# Patient Record
Sex: MALE | Race: WHITE | Employment: OTHER | URBAN - METROPOLITAN AREA
[De-identification: names, ages, dates, MRNs, and addresses within clinical notes are randomized per-mention and may not be internally consistent; named-entity substitution may affect disease eponyms.]

---

## 2021-12-22 ENCOUNTER — HOSPITAL ENCOUNTER (OUTPATIENT)
Dept: CT IMAGING | Age: 51
Discharge: HOME | End: 2021-12-22
Attending: INTERNAL MEDICINE

## 2021-12-22 DIAGNOSIS — Z13.6 SCREENING FOR CARDIOVASCULAR CONDITION: ICD-10-CM

## 2022-01-02 ENCOUNTER — HOSPITAL ENCOUNTER (OUTPATIENT)
Age: 52
Discharge: ANOTHER HOSPITAL | End: 2022-01-02
Attending: EMERGENCY MEDICINE
Payer: COMMERCIAL

## 2022-01-02 ENCOUNTER — HOSPITAL ENCOUNTER (EMERGENCY)
Facility: HOSPITAL | Age: 52
Discharge: LEFT BEFORE DISCHARGE | End: 2022-01-02
Payer: COMMERCIAL

## 2022-01-02 VITALS
HEART RATE: 86 BPM | BODY MASS INDEX: 30.01 KG/M2 | HEIGHT: 68 IN | SYSTOLIC BLOOD PRESSURE: 138 MMHG | OXYGEN SATURATION: 95 % | WEIGHT: 198 LBS | TEMPERATURE: 98 F | RESPIRATION RATE: 18 BRPM | DIASTOLIC BLOOD PRESSURE: 84 MMHG

## 2022-01-02 DIAGNOSIS — Z20.822 SUSPECTED COVID-19 VIRUS INFECTION: Primary | ICD-10-CM

## 2022-01-02 DIAGNOSIS — R07.9 CHEST PAIN OF UNCERTAIN ETIOLOGY: ICD-10-CM

## 2022-01-02 LAB — SARS-COV-2 RNA RESP QL NAA+PROBE: NOT DETECTED

## 2022-01-02 PROCEDURE — 99204 OFFICE O/P NEW MOD 45 MIN: CPT

## 2022-01-02 PROCEDURE — 99214 OFFICE O/P EST MOD 30 MIN: CPT

## 2022-01-02 PROCEDURE — 93005 ELECTROCARDIOGRAM TRACING: CPT

## 2022-01-02 PROCEDURE — 93010 ELECTROCARDIOGRAM REPORT: CPT

## 2022-01-02 RX ORDER — ACETAMINOPHEN 325 MG/1
325 TABLET ORAL EVERY 6 HOURS PRN
COMMUNITY

## 2022-01-02 NOTE — ED PROVIDER NOTES
Patient Seen in: [facility]      History   Patient presents with:  Cough/URI  Difficulty Breathing  Headache  Sore Throat    Stated Complaint: BODY ACHES,SORE THROAT    Subjective:   HPI     53-year-old male comes to the hospital complain noted    ED Course     Labs Reviewed   RAPID SARS-COV-2 BY PCR     EKG    Rate, intervals and axes as noted on EKG Report. Rate: 86  Rhythm: Sinus Rhythm  Reading: Agreed              The patient does have suspected Covid.   Patient is having chest discomf

## 2022-01-02 NOTE — ED QUICK NOTES
Patient wants to be registered before seeing a nurse or doctor because he is concerned about a deductable that he can not pay at this time he sts he feels better.  RN explained that the doctor at immediate care was concerned and the best thing to do would b

## 2022-01-02 NOTE — ED INITIAL ASSESSMENT (HPI)
Wife has covid; pt with cough/sob/headache/sore throat x 1 week - worsened last night     Pt c/o chest pain and back pain    No fever

## 2022-01-03 LAB
ATRIAL RATE: 86 BPM
P AXIS: 68 DEGREES
P-R INTERVAL: 154 MS
Q-T INTERVAL: 370 MS
QRS DURATION: 76 MS
QTC CALCULATION (BEZET): 442 MS
R AXIS: 35 DEGREES
T AXIS: 58 DEGREES
VENTRICULAR RATE: 86 BPM

## 2022-01-10 ENCOUNTER — HOSPITAL ENCOUNTER (OUTPATIENT)
Age: 52
Discharge: HOME | End: 2022-01-10
Payer: COMMERCIAL

## 2022-01-10 VITALS
OXYGEN SATURATION: 97 % | WEIGHT: 200 LBS | SYSTOLIC BLOOD PRESSURE: 143 MMHG | BODY MASS INDEX: 30.31 KG/M2 | RESPIRATION RATE: 18 BRPM | HEIGHT: 68 IN | DIASTOLIC BLOOD PRESSURE: 80 MMHG | HEART RATE: 78 BPM | TEMPERATURE: 98 F

## 2022-01-10 DIAGNOSIS — H66.002 ACUTE SUPPURATIVE OTITIS MEDIA OF LEFT EAR WITHOUT SPONTANEOUS RUPTURE OF TYMPANIC MEMBRANE, RECURRENCE NOT SPECIFIED: Primary | ICD-10-CM

## 2022-01-10 DIAGNOSIS — H61.22 IMPACTED CERUMEN OF LEFT EAR: ICD-10-CM

## 2022-01-10 PROCEDURE — 69209 REMOVE IMPACTED EAR WAX UNI: CPT

## 2022-01-10 PROCEDURE — 99213 OFFICE O/P EST LOW 20 MIN: CPT

## 2022-01-10 RX ORDER — AMOXICILLIN 875 MG/1
875 TABLET, COATED ORAL 2 TIMES DAILY
Qty: 20 TABLET | Refills: 0 | Status: SHIPPED | OUTPATIENT
Start: 2022-01-10 | End: 2022-01-20

## 2022-01-10 NOTE — ED PROVIDER NOTES
Patient Seen in: [facility]      History   Patient presents with:  Ear Pain    Stated Complaint: no hearing in left ear / ear pain    Subjective:   HPI  Patient is a 69-year-old male with past medical history of asthma complains of 4-day No mucosal edema, congestion or rhinorrhea. Right Sinus: No maxillary sinus tenderness or frontal sinus tenderness. Left Sinus: No maxillary sinus tenderness or frontal sinus tenderness.       Mouth/Throat:      Mouth: Mucous membranes are moist. provider referral if needed          Medications Prescribed:  Current Discharge Medication List    START taking these medications    amoxicillin 875 MG Oral Tab  Take 1 tablet (875 mg total) by mouth 2 (two) times daily for 10 days.   Qty: 20 tablet Refills

## 2022-01-17 ENCOUNTER — OFFICE VISIT (OUTPATIENT)
Dept: FAMILY MEDICINE CLINIC | Facility: CLINIC | Age: 52
End: 2022-01-17
Payer: COMMERCIAL

## 2022-01-17 VITALS
WEIGHT: 209 LBS | SYSTOLIC BLOOD PRESSURE: 138 MMHG | BODY MASS INDEX: 31.67 KG/M2 | OXYGEN SATURATION: 96 % | HEIGHT: 68 IN | DIASTOLIC BLOOD PRESSURE: 78 MMHG | RESPIRATION RATE: 18 BRPM | HEART RATE: 81 BPM

## 2022-01-17 DIAGNOSIS — J45.20 MILD INTERMITTENT ASTHMA WITHOUT COMPLICATION: ICD-10-CM

## 2022-01-17 DIAGNOSIS — H61.22 IMPACTED CERUMEN OF LEFT EAR: ICD-10-CM

## 2022-01-17 DIAGNOSIS — Z00.00 WELLNESS EXAMINATION: Primary | ICD-10-CM

## 2022-01-17 DIAGNOSIS — Z12.11 SCREENING FOR COLON CANCER: ICD-10-CM

## 2022-01-17 DIAGNOSIS — H65.192 OTHER NON-RECURRENT ACUTE NONSUPPURATIVE OTITIS MEDIA OF LEFT EAR: ICD-10-CM

## 2022-01-17 DIAGNOSIS — R91.1 PULMONARY NODULE: ICD-10-CM

## 2022-01-17 DIAGNOSIS — Z00.00 LABORATORY EXAMINATION ORDERED AS PART OF A ROUTINE GENERAL MEDICAL EXAMINATION: ICD-10-CM

## 2022-01-17 DIAGNOSIS — Z23 NEED FOR VACCINATION: ICD-10-CM

## 2022-01-17 DIAGNOSIS — Z12.5 ENCOUNTER FOR PROSTATE CANCER SCREENING: ICD-10-CM

## 2022-01-17 PROCEDURE — 3075F SYST BP GE 130 - 139MM HG: CPT | Performed by: FAMILY MEDICINE

## 2022-01-17 PROCEDURE — 99204 OFFICE O/P NEW MOD 45 MIN: CPT | Performed by: FAMILY MEDICINE

## 2022-01-17 PROCEDURE — 90471 IMMUNIZATION ADMIN: CPT | Performed by: FAMILY MEDICINE

## 2022-01-17 PROCEDURE — 3078F DIAST BP <80 MM HG: CPT | Performed by: FAMILY MEDICINE

## 2022-01-17 PROCEDURE — 99386 PREV VISIT NEW AGE 40-64: CPT | Performed by: FAMILY MEDICINE

## 2022-01-17 PROCEDURE — 3008F BODY MASS INDEX DOCD: CPT | Performed by: FAMILY MEDICINE

## 2022-01-17 PROCEDURE — G0438 PPPS, INITIAL VISIT: HCPCS | Performed by: FAMILY MEDICINE

## 2022-01-17 PROCEDURE — 90686 IIV4 VACC NO PRSV 0.5 ML IM: CPT | Performed by: FAMILY MEDICINE

## 2022-01-17 RX ORDER — MONTELUKAST SODIUM 10 MG/1
10 TABLET ORAL NIGHTLY
COMMUNITY
Start: 2021-07-22 | End: 2022-01-20

## 2022-01-17 RX ORDER — ALBUTEROL SULFATE 90 UG/1
2 AEROSOL, METERED RESPIRATORY (INHALATION) EVERY 6 HOURS PRN
Qty: 1 EACH | Refills: 2 | Status: SHIPPED | OUTPATIENT
Start: 2022-01-17

## 2022-01-17 NOTE — PROGRESS NOTES
HPI:   Lore Parks is a 45year old male who presents for an Annual Health Visit. Asthma - has chronic history. Does report mild dyspnea, chest tightness twice weekly.  He was previously on albuterol, montelukast but currently not taking any medi negative  Heme/Lymph: negative  Musculoskeletal: negative  Neurological: negative  Psych: negative  Endocrine: negative  Allergic/Immune: negative    EXAM:   /78   Pulse 81   Resp 18   Ht 5' 8\" (1.727 m)   Wt 209 lb (94.8 kg)   SpO2 96%   BMI 31.78 minutes of moderate intensity exercise weekly. Make sure you are staying adequately hydrated. Aim to get 7-9 hours of sleep nightly.      Laboratory examination ordered as part of a routine general medical examination  -     CBC WITH DIFFERENTIAL WITH PLATE you need.   Screening Who needs it How often   Unhealthy alcohol use All men in this age group At routine exams   Blood pressure All men in this age group Yearly checkup if your blood pressure is normal  Normal blood pressure is 120/80 mm Hg  If your blood age 59 at routine exams; talk with your healthcare provider if you are at risk   Lung cancer Men between ages 48 and [de-identified] who are in fairly good health and are at higher risk for lung cancer:  · Currently smoke or who have quit within past 15 years  · 20-pac your healthcare provider   Influenza (flu) All men in this age group Once a year   COVID-23 All men in this age group 1 to 2 doses depending on vaccine; talk with your healthcare provider   Measles, mumps, rubella (MMR) Men in this age group born in 36 o medicines for cholesterol Men between the ages of 36 and 76 years who have  · An LDL-C level of more than 70 mg/dL but less than 190 mg/dL, no diabetes and borderline to high level of risk  · An LDL-C level of 190 mg/dL or greater  · A diagnosis of diabete

## 2022-01-17 NOTE — PATIENT INSTRUCTIONS
Prevention Guidelines, Men Ages 48 to 59  Screening tests and vaccines are an important part of managing your health. A screening test is done to find diseases in people who don't have any symptoms.  The goal is to find a disease early so lifestyle flood in this age group At routine exams   Type 2 diabetes or prediabetes All men beginning at age 39 and men without symptoms at any age who are overweight or obese and have 1 or more other risk factors for diabetes At least every 3 years (yearly if your blood needs it How often   Chickenpox (varicella) All men in this age group who have no record of this infection or vaccine 2 doses; second dose should be given at least 4 weeks after the first dose   Hepatitis A Men at increased risk for infection 2 or 3 doses age group 2 doses; the 2nd dose is given 2 to 6 months after the first. This is given even if you've had shingles before, not sure if you have had chickenpox, or had a previous zoster live vaccine   Counseling Who needs it How often   Diet and exercise Men

## 2022-01-21 ENCOUNTER — LAB ENCOUNTER (OUTPATIENT)
Dept: LAB | Age: 52
End: 2022-01-21
Attending: FAMILY MEDICINE
Payer: COMMERCIAL

## 2022-01-21 DIAGNOSIS — Z00.00 LABORATORY EXAMINATION ORDERED AS PART OF A ROUTINE GENERAL MEDICAL EXAMINATION: ICD-10-CM

## 2022-01-21 DIAGNOSIS — Z12.5 ENCOUNTER FOR PROSTATE CANCER SCREENING: ICD-10-CM

## 2022-01-21 LAB
ALBUMIN SERPL-MCNC: 4 G/DL (ref 3.4–5)
ALBUMIN/GLOB SERPL: 1.3 {RATIO} (ref 1–2)
ALP LIVER SERPL-CCNC: 56 U/L
ALT SERPL-CCNC: 43 U/L
ANION GAP SERPL CALC-SCNC: 6 MMOL/L (ref 0–18)
AST SERPL-CCNC: 17 U/L (ref 15–37)
BASOPHILS # BLD AUTO: 0.05 X10(3) UL (ref 0–0.2)
BASOPHILS NFR BLD AUTO: 0.6 %
BILIRUB SERPL-MCNC: 1.1 MG/DL (ref 0.1–2)
BUN BLD-MCNC: 19 MG/DL (ref 7–18)
CALCIUM BLD-MCNC: 9.1 MG/DL (ref 8.5–10.1)
CHLORIDE SERPL-SCNC: 106 MMOL/L (ref 98–112)
CHOLEST SERPL-MCNC: 210 MG/DL (ref ?–200)
CO2 SERPL-SCNC: 28 MMOL/L (ref 21–32)
COMPLEXED PSA SERPL-MCNC: 0.81 NG/ML (ref ?–4)
CREAT BLD-MCNC: 0.83 MG/DL
EOSINOPHIL # BLD AUTO: 0.2 X10(3) UL (ref 0–0.7)
EOSINOPHIL NFR BLD AUTO: 2.5 %
ERYTHROCYTE [DISTWIDTH] IN BLOOD BY AUTOMATED COUNT: 13.2 %
FASTING PATIENT LIPID ANSWER: YES
FASTING STATUS PATIENT QL REPORTED: YES
GLOBULIN PLAS-MCNC: 3.2 G/DL (ref 2.8–4.4)
GLUCOSE BLD-MCNC: 104 MG/DL (ref 70–99)
HCT VFR BLD AUTO: 47.2 %
HDLC SERPL-MCNC: 47 MG/DL (ref 40–59)
HGB BLD-MCNC: 15.1 G/DL
IMM GRANULOCYTES # BLD AUTO: 0.04 X10(3) UL (ref 0–1)
IMM GRANULOCYTES NFR BLD: 0.5 %
LDLC SERPL CALC-MCNC: 141 MG/DL (ref ?–100)
LYMPHOCYTES # BLD AUTO: 3.59 X10(3) UL (ref 1–4)
LYMPHOCYTES NFR BLD AUTO: 45.2 %
MCH RBC QN AUTO: 27.3 PG (ref 26–34)
MCHC RBC AUTO-ENTMCNC: 32 G/DL (ref 31–37)
MCV RBC AUTO: 85.4 FL
MONOCYTES # BLD AUTO: 0.73 X10(3) UL (ref 0.1–1)
MONOCYTES NFR BLD AUTO: 9.2 %
NEUTROPHILS # BLD AUTO: 3.34 X10 (3) UL (ref 1.5–7.7)
NEUTROPHILS # BLD AUTO: 3.34 X10(3) UL (ref 1.5–7.7)
NEUTROPHILS NFR BLD AUTO: 42 %
NONHDLC SERPL-MCNC: 163 MG/DL (ref ?–130)
OSMOLALITY SERPL CALC.SUM OF ELEC: 293 MOSM/KG (ref 275–295)
PLATELET # BLD AUTO: 234 10(3)UL (ref 150–450)
POTASSIUM SERPL-SCNC: 4.1 MMOL/L (ref 3.5–5.1)
PROT SERPL-MCNC: 7.2 G/DL (ref 6.4–8.2)
RBC # BLD AUTO: 5.53 X10(6)UL
SODIUM SERPL-SCNC: 140 MMOL/L (ref 136–145)
TRIGL SERPL-MCNC: 124 MG/DL (ref 30–149)
TSI SER-ACNC: 1.48 MIU/ML (ref 0.36–3.74)
VLDLC SERPL CALC-MCNC: 23 MG/DL (ref 0–30)
WBC # BLD AUTO: 8 X10(3) UL (ref 4–11)

## 2022-01-21 PROCEDURE — 80061 LIPID PANEL: CPT

## 2022-01-21 PROCEDURE — 80053 COMPREHEN METABOLIC PANEL: CPT

## 2022-01-21 PROCEDURE — 84443 ASSAY THYROID STIM HORMONE: CPT

## 2022-01-21 PROCEDURE — 36415 COLL VENOUS BLD VENIPUNCTURE: CPT

## 2022-01-21 PROCEDURE — 85025 COMPLETE CBC W/AUTO DIFF WBC: CPT

## 2022-01-23 ENCOUNTER — TELEPHONE (OUTPATIENT)
Dept: FAMILY MEDICINE CLINIC | Facility: CLINIC | Age: 52
End: 2022-01-23

## 2022-01-23 DIAGNOSIS — E78.5 HYPERLIPIDEMIA, UNSPECIFIED HYPERLIPIDEMIA TYPE: Primary | ICD-10-CM

## 2022-01-23 DIAGNOSIS — R73.01 IMPAIRED FASTING GLUCOSE: ICD-10-CM

## 2022-01-23 NOTE — TELEPHONE ENCOUNTER
----- Message from Ondina Alexandre MD sent at 1/21/2022  4:23 PM CST -----  Results reviewed. Please inform patient to eat more fruits/vegetables, more lean meat, less red meat inc exercise.  Needs to repeat lipid and glucose in 6 months and follow up with PCP

## 2022-01-24 ENCOUNTER — OFFICE VISIT (OUTPATIENT)
Dept: SURGERY | Facility: CLINIC | Age: 52
End: 2022-01-24
Payer: COMMERCIAL

## 2022-01-24 VITALS — HEIGHT: 68 IN | TEMPERATURE: 97 F | BODY MASS INDEX: 31.67 KG/M2 | WEIGHT: 209 LBS

## 2022-01-24 DIAGNOSIS — Z12.11 ENCOUNTER FOR SCREENING COLONOSCOPY: Primary | ICD-10-CM

## 2022-01-24 PROCEDURE — 3008F BODY MASS INDEX DOCD: CPT | Performed by: COLON & RECTAL SURGERY

## 2022-01-24 PROCEDURE — S0285 CNSLT BEFORE SCREEN COLONOSC: HCPCS | Performed by: COLON & RECTAL SURGERY

## 2022-01-24 RX ORDER — POLYETHYLENE GLYCOL 3350, SODIUM CHLORIDE, SODIUM BICARBONATE, POTASSIUM CHLORIDE 420; 11.2; 5.72; 1.48 G/4L; G/4L; G/4L; G/4L
POWDER, FOR SOLUTION ORAL
Qty: 1 EACH | Refills: 0 | Status: SHIPPED | OUTPATIENT
Start: 2022-01-24

## 2022-01-24 NOTE — H&P
New Patient Visit Note       Active Problems      1.  Encounter for screening colonoscopy        Chief Complaint   Patient presents with:  Colonoscopy      PCP  Brijesh Bhatia MD     History of Present Illness   Alida Gibbs is a 45year old male w screening colonoscopy  (primary encounter diagnosis)      Plan   The patient is 45year old and has not had a colonoscopy. The patient's family history is negative. The patient does not have gastrointestinal symptoms.      Recommend proceeding with colonosc

## 2022-07-20 ENCOUNTER — OFFICE VISIT (OUTPATIENT)
Dept: FAMILY MEDICINE CLINIC | Facility: CLINIC | Age: 52
End: 2022-07-20
Payer: COMMERCIAL

## 2022-07-20 VITALS
OXYGEN SATURATION: 97 % | HEART RATE: 83 BPM | BODY MASS INDEX: 31.07 KG/M2 | WEIGHT: 205 LBS | SYSTOLIC BLOOD PRESSURE: 112 MMHG | HEIGHT: 68 IN | DIASTOLIC BLOOD PRESSURE: 82 MMHG | RESPIRATION RATE: 16 BRPM

## 2022-07-20 DIAGNOSIS — S76.011A MUSCLE STRAIN OF RIGHT GLUTEAL REGION, INITIAL ENCOUNTER: ICD-10-CM

## 2022-07-20 DIAGNOSIS — J45.20 MILD INTERMITTENT ASTHMA WITHOUT COMPLICATION: Primary | ICD-10-CM

## 2022-07-20 DIAGNOSIS — K21.9 GASTROESOPHAGEAL REFLUX DISEASE WITHOUT ESOPHAGITIS: ICD-10-CM

## 2022-07-20 DIAGNOSIS — L30.9 DERMATITIS: ICD-10-CM

## 2022-07-20 PROCEDURE — 3074F SYST BP LT 130 MM HG: CPT | Performed by: FAMILY MEDICINE

## 2022-07-20 PROCEDURE — 3008F BODY MASS INDEX DOCD: CPT | Performed by: FAMILY MEDICINE

## 2022-07-20 PROCEDURE — 3079F DIAST BP 80-89 MM HG: CPT | Performed by: FAMILY MEDICINE

## 2022-07-20 PROCEDURE — 99214 OFFICE O/P EST MOD 30 MIN: CPT | Performed by: FAMILY MEDICINE

## 2022-07-20 RX ORDER — CLOTRIMAZOLE AND BETAMETHASONE DIPROPIONATE 10; .64 MG/G; MG/G
1 CREAM TOPICAL 2 TIMES DAILY PRN
Qty: 60 G | Refills: 3 | Status: SHIPPED | OUTPATIENT
Start: 2022-07-20 | End: 2022-08-03

## 2022-07-20 RX ORDER — NAPROXEN 500 MG/1
500 TABLET ORAL 2 TIMES DAILY WITH MEALS
Qty: 60 TABLET | Refills: 0 | Status: SHIPPED | OUTPATIENT
Start: 2022-07-20

## 2022-07-20 RX ORDER — PANTOPRAZOLE SODIUM 40 MG/1
40 TABLET, DELAYED RELEASE ORAL
Qty: 90 TABLET | Refills: 1 | Status: SHIPPED | OUTPATIENT
Start: 2022-07-20

## 2022-07-26 ENCOUNTER — HOSPITAL ENCOUNTER (OUTPATIENT)
Dept: GENERAL RADIOLOGY | Age: 52
Discharge: HOME | End: 2022-07-26
Attending: FAMILY MEDICINE
Payer: COMMERCIAL

## 2022-07-26 DIAGNOSIS — S76.011A MUSCLE STRAIN OF RIGHT GLUTEAL REGION, INITIAL ENCOUNTER: ICD-10-CM

## 2022-07-26 PROCEDURE — 73502 X-RAY EXAM HIP UNI 2-3 VIEWS: CPT | Performed by: FAMILY MEDICINE

## 2022-08-24 ENCOUNTER — OFFICE VISIT (OUTPATIENT)
Dept: FAMILY MEDICINE CLINIC | Facility: CLINIC | Age: 52
End: 2022-08-24
Payer: COMMERCIAL

## 2022-08-24 VITALS
HEART RATE: 73 BPM | WEIGHT: 205 LBS | RESPIRATION RATE: 16 BRPM | SYSTOLIC BLOOD PRESSURE: 112 MMHG | DIASTOLIC BLOOD PRESSURE: 84 MMHG | OXYGEN SATURATION: 97 % | HEIGHT: 68 IN | BODY MASS INDEX: 31.07 KG/M2

## 2022-08-24 DIAGNOSIS — M16.11 PRIMARY OSTEOARTHRITIS OF RIGHT HIP: Primary | ICD-10-CM

## 2022-08-24 DIAGNOSIS — G47.33 OBSTRUCTIVE SLEEP APNEA: ICD-10-CM

## 2022-08-24 DIAGNOSIS — K14.5 TONGUE, FISSURED: ICD-10-CM

## 2022-08-24 PROCEDURE — 99214 OFFICE O/P EST MOD 30 MIN: CPT | Performed by: FAMILY MEDICINE

## 2022-08-24 PROCEDURE — 3074F SYST BP LT 130 MM HG: CPT | Performed by: FAMILY MEDICINE

## 2022-08-24 PROCEDURE — 3008F BODY MASS INDEX DOCD: CPT | Performed by: FAMILY MEDICINE

## 2022-08-24 PROCEDURE — 3079F DIAST BP 80-89 MM HG: CPT | Performed by: FAMILY MEDICINE

## 2022-08-24 RX ORDER — CHLORHEXIDINE GLUCONATE 2% 2 G/100ML
SOLUTION TOPICAL
Qty: 473 ML | Refills: 0 | Status: SHIPPED | OUTPATIENT
Start: 2022-08-24

## 2022-08-24 NOTE — PATIENT INSTRUCTIONS
1. Follow up with physical therapy/orthopedics doctor. 2. Follow up with pulmonologist for sleep apnea management. 3. Start prescription mouthwash. Use for 1 week. Follow up with dentist if no improvement.

## 2022-12-16 ENCOUNTER — LAB ENCOUNTER (OUTPATIENT)
Dept: LAB | Age: 52
End: 2022-12-16
Attending: FAMILY MEDICINE
Payer: COMMERCIAL

## 2022-12-16 DIAGNOSIS — R73.01 IMPAIRED FASTING GLUCOSE: ICD-10-CM

## 2022-12-16 DIAGNOSIS — E78.5 HYPERLIPIDEMIA, UNSPECIFIED HYPERLIPIDEMIA TYPE: ICD-10-CM

## 2022-12-16 DIAGNOSIS — Z00.00 LABORATORY EXAMINATION ORDERED AS PART OF A ROUTINE GENERAL MEDICAL EXAMINATION: ICD-10-CM

## 2022-12-16 LAB
ALBUMIN SERPL-MCNC: 3.7 G/DL (ref 3.4–5)
ALBUMIN/GLOB SERPL: 1 {RATIO} (ref 1–2)
ALP LIVER SERPL-CCNC: 46 U/L
ALT SERPL-CCNC: 24 U/L
ANION GAP SERPL CALC-SCNC: 3 MMOL/L (ref 0–18)
AST SERPL-CCNC: 17 U/L (ref 15–37)
BASOPHILS # BLD AUTO: 0.05 X10(3) UL (ref 0–0.2)
BASOPHILS NFR BLD AUTO: 0.6 %
BILIRUB SERPL-MCNC: 0.9 MG/DL (ref 0.1–2)
BUN BLD-MCNC: 14 MG/DL (ref 7–18)
CALCIUM BLD-MCNC: 8.9 MG/DL (ref 8.5–10.1)
CHLORIDE SERPL-SCNC: 107 MMOL/L (ref 98–112)
CHOLEST SERPL-MCNC: 143 MG/DL (ref ?–200)
CO2 SERPL-SCNC: 30 MMOL/L (ref 21–32)
CREAT BLD-MCNC: 0.96 MG/DL
EOSINOPHIL # BLD AUTO: 0.11 X10(3) UL (ref 0–0.7)
EOSINOPHIL NFR BLD AUTO: 1.2 %
ERYTHROCYTE [DISTWIDTH] IN BLOOD BY AUTOMATED COUNT: 13.3 %
FASTING PATIENT LIPID ANSWER: YES
FASTING STATUS PATIENT QL REPORTED: YES
GFR SERPLBLD BASED ON 1.73 SQ M-ARVRAT: 95 ML/MIN/1.73M2 (ref 60–?)
GLOBULIN PLAS-MCNC: 3.6 G/DL (ref 2.8–4.4)
GLUCOSE BLD-MCNC: 115 MG/DL (ref 70–99)
HCT VFR BLD AUTO: 45.1 %
HDLC SERPL-MCNC: 54 MG/DL (ref 40–59)
HGB BLD-MCNC: 15 G/DL
IMM GRANULOCYTES # BLD AUTO: 0.03 X10(3) UL (ref 0–1)
IMM GRANULOCYTES NFR BLD: 0.3 %
LDLC SERPL CALC-MCNC: 73 MG/DL (ref ?–100)
LYMPHOCYTES # BLD AUTO: 2.95 X10(3) UL (ref 1–4)
LYMPHOCYTES NFR BLD AUTO: 33 %
MCH RBC QN AUTO: 28.3 PG (ref 26–34)
MCHC RBC AUTO-ENTMCNC: 33.3 G/DL (ref 31–37)
MCV RBC AUTO: 85.1 FL
MONOCYTES # BLD AUTO: 0.72 X10(3) UL (ref 0.1–1)
MONOCYTES NFR BLD AUTO: 8 %
NEUTROPHILS # BLD AUTO: 5.09 X10 (3) UL (ref 1.5–7.7)
NEUTROPHILS # BLD AUTO: 5.09 X10(3) UL (ref 1.5–7.7)
NEUTROPHILS NFR BLD AUTO: 56.9 %
NONHDLC SERPL-MCNC: 89 MG/DL (ref ?–130)
OSMOLALITY SERPL CALC.SUM OF ELEC: 291 MOSM/KG (ref 275–295)
PLATELET # BLD AUTO: 275 10(3)UL (ref 150–450)
POTASSIUM SERPL-SCNC: 3.7 MMOL/L (ref 3.5–5.1)
PROT SERPL-MCNC: 7.3 G/DL (ref 6.4–8.2)
RBC # BLD AUTO: 5.3 X10(6)UL
SODIUM SERPL-SCNC: 140 MMOL/L (ref 136–145)
TRIGL SERPL-MCNC: 86 MG/DL (ref 30–149)
TSI SER-ACNC: 0.86 MIU/ML (ref 0.36–3.74)
VLDLC SERPL CALC-MCNC: 13 MG/DL (ref 0–30)
WBC # BLD AUTO: 9 X10(3) UL (ref 4–11)

## 2022-12-16 PROCEDURE — 36415 COLL VENOUS BLD VENIPUNCTURE: CPT

## 2022-12-16 PROCEDURE — 85025 COMPLETE CBC W/AUTO DIFF WBC: CPT

## 2022-12-16 PROCEDURE — 80053 COMPREHEN METABOLIC PANEL: CPT

## 2022-12-16 PROCEDURE — 80061 LIPID PANEL: CPT

## 2022-12-16 PROCEDURE — 84443 ASSAY THYROID STIM HORMONE: CPT

## 2022-12-21 ENCOUNTER — TELEPHONE (OUTPATIENT)
Dept: FAMILY MEDICINE CLINIC | Facility: CLINIC | Age: 52
End: 2022-12-21

## 2022-12-21 DIAGNOSIS — R73.9 HYPERGLYCEMIA: Primary | ICD-10-CM

## 2022-12-21 NOTE — TELEPHONE ENCOUNTER
Called Pt / Bolivian speaker/ to notify lab results and MD recommendations. Pt verbalized understanding / agreed with plan of care.

## 2022-12-21 NOTE — TELEPHONE ENCOUNTER
----- Message from Wilburn Castleman, MD sent at 12/21/2022 10:43 AM CST -----  Annual labs reviewed. Fasting blood sugar slightly worsened since last year. Follow low-carb diet and regular exercise add on A1c. Otherwise labs look good overall.

## 2023-01-03 ENCOUNTER — OFFICE VISIT (OUTPATIENT)
Dept: FAMILY MEDICINE CLINIC | Facility: CLINIC | Age: 53
End: 2023-01-03
Payer: COMMERCIAL

## 2023-01-03 VITALS
TEMPERATURE: 99 F | HEART RATE: 110 BPM | DIASTOLIC BLOOD PRESSURE: 70 MMHG | SYSTOLIC BLOOD PRESSURE: 121 MMHG | HEIGHT: 68 IN | BODY MASS INDEX: 29.55 KG/M2 | OXYGEN SATURATION: 96 % | WEIGHT: 195 LBS

## 2023-01-03 DIAGNOSIS — L50.9 URTICARIA: Primary | ICD-10-CM

## 2023-01-03 PROCEDURE — 99213 OFFICE O/P EST LOW 20 MIN: CPT | Performed by: PHYSICIAN ASSISTANT

## 2023-01-03 PROCEDURE — 3078F DIAST BP <80 MM HG: CPT | Performed by: PHYSICIAN ASSISTANT

## 2023-01-03 PROCEDURE — 3074F SYST BP LT 130 MM HG: CPT | Performed by: PHYSICIAN ASSISTANT

## 2023-01-03 PROCEDURE — 3008F BODY MASS INDEX DOCD: CPT | Performed by: PHYSICIAN ASSISTANT

## 2023-01-03 RX ORDER — PREDNISONE 10 MG/1
TABLET ORAL
Qty: 20 TABLET | Refills: 0 | Status: SHIPPED | OUTPATIENT
Start: 2023-01-03

## 2023-01-05 ENCOUNTER — HOSPITAL ENCOUNTER (EMERGENCY)
Age: 53
Discharge: HOME | End: 2023-01-05
Attending: EMERGENCY MEDICINE
Payer: COMMERCIAL

## 2023-01-05 ENCOUNTER — APPOINTMENT (OUTPATIENT)
Dept: ULTRASOUND IMAGING | Age: 53
End: 2023-01-05
Attending: EMERGENCY MEDICINE
Payer: COMMERCIAL

## 2023-01-05 VITALS
TEMPERATURE: 99 F | SYSTOLIC BLOOD PRESSURE: 128 MMHG | HEIGHT: 68 IN | WEIGHT: 195 LBS | BODY MASS INDEX: 29.55 KG/M2 | OXYGEN SATURATION: 97 % | DIASTOLIC BLOOD PRESSURE: 73 MMHG | RESPIRATION RATE: 20 BRPM | HEART RATE: 76 BPM

## 2023-01-05 DIAGNOSIS — R10.11 RUQ ABDOMINAL PAIN: ICD-10-CM

## 2023-01-05 DIAGNOSIS — T78.40XD ALLERGIC REACTION, SUBSEQUENT ENCOUNTER: Primary | ICD-10-CM

## 2023-01-05 LAB
ALBUMIN SERPL-MCNC: 3.5 G/DL (ref 3.4–5)
ALBUMIN/GLOB SERPL: 1 {RATIO} (ref 1–2)
ALP LIVER SERPL-CCNC: 84 U/L
ALT SERPL-CCNC: 56 U/L
ANION GAP SERPL CALC-SCNC: 7 MMOL/L (ref 0–18)
AST SERPL-CCNC: 16 U/L (ref 15–37)
BASOPHILS # BLD AUTO: 0.02 X10(3) UL (ref 0–0.2)
BASOPHILS NFR BLD AUTO: 0.1 %
BILIRUB SERPL-MCNC: 0.6 MG/DL (ref 0.1–2)
BUN BLD-MCNC: 22 MG/DL (ref 7–18)
CALCIUM BLD-MCNC: 9 MG/DL (ref 8.5–10.1)
CHLORIDE SERPL-SCNC: 105 MMOL/L (ref 98–112)
CO2 SERPL-SCNC: 27 MMOL/L (ref 21–32)
CREAT BLD-MCNC: 0.88 MG/DL
EOSINOPHIL # BLD AUTO: 0.06 X10(3) UL (ref 0–0.7)
EOSINOPHIL NFR BLD AUTO: 0.4 %
ERYTHROCYTE [DISTWIDTH] IN BLOOD BY AUTOMATED COUNT: 13.9 %
GFR SERPLBLD BASED ON 1.73 SQ M-ARVRAT: 103 ML/MIN/1.73M2 (ref 60–?)
GLOBULIN PLAS-MCNC: 3.6 G/DL (ref 2.8–4.4)
GLUCOSE BLD-MCNC: 116 MG/DL (ref 70–99)
HCT VFR BLD AUTO: 42.8 %
HGB BLD-MCNC: 14 G/DL
IMM GRANULOCYTES # BLD AUTO: 0.09 X10(3) UL (ref 0–1)
IMM GRANULOCYTES NFR BLD: 0.6 %
LIPASE SERPL-CCNC: 84 U/L (ref 73–393)
LYMPHOCYTES # BLD AUTO: 3.92 X10(3) UL (ref 1–4)
LYMPHOCYTES NFR BLD AUTO: 25.7 %
MCH RBC QN AUTO: 27.2 PG (ref 26–34)
MCHC RBC AUTO-ENTMCNC: 32.7 G/DL (ref 31–37)
MCV RBC AUTO: 83.1 FL
MONOCYTES # BLD AUTO: 1.04 X10(3) UL (ref 0.1–1)
MONOCYTES NFR BLD AUTO: 6.8 %
NEUTROPHILS # BLD AUTO: 10.15 X10 (3) UL (ref 1.5–7.7)
NEUTROPHILS # BLD AUTO: 10.15 X10(3) UL (ref 1.5–7.7)
NEUTROPHILS NFR BLD AUTO: 66.4 %
OSMOLALITY SERPL CALC.SUM OF ELEC: 292 MOSM/KG (ref 275–295)
PLATELET # BLD AUTO: 265 10(3)UL (ref 150–450)
POTASSIUM SERPL-SCNC: 3.7 MMOL/L (ref 3.5–5.1)
PROT SERPL-MCNC: 7.1 G/DL (ref 6.4–8.2)
RBC # BLD AUTO: 5.15 X10(6)UL
SODIUM SERPL-SCNC: 139 MMOL/L (ref 136–145)
WBC # BLD AUTO: 15.3 X10(3) UL (ref 4–11)

## 2023-01-05 PROCEDURE — 80053 COMPREHEN METABOLIC PANEL: CPT | Performed by: EMERGENCY MEDICINE

## 2023-01-05 PROCEDURE — 96361 HYDRATE IV INFUSION ADD-ON: CPT

## 2023-01-05 PROCEDURE — 96374 THER/PROPH/DIAG INJ IV PUSH: CPT

## 2023-01-05 PROCEDURE — 85025 COMPLETE CBC W/AUTO DIFF WBC: CPT | Performed by: EMERGENCY MEDICINE

## 2023-01-05 PROCEDURE — S0028 INJECTION, FAMOTIDINE, 20 MG: HCPCS | Performed by: EMERGENCY MEDICINE

## 2023-01-05 PROCEDURE — 99284 EMERGENCY DEPT VISIT MOD MDM: CPT

## 2023-01-05 PROCEDURE — 96375 TX/PRO/DX INJ NEW DRUG ADDON: CPT

## 2023-01-05 PROCEDURE — 76700 US EXAM ABDOM COMPLETE: CPT | Performed by: EMERGENCY MEDICINE

## 2023-01-05 PROCEDURE — 83690 ASSAY OF LIPASE: CPT | Performed by: EMERGENCY MEDICINE

## 2023-01-05 RX ORDER — METHYLPREDNISOLONE SODIUM SUCCINATE 125 MG/2ML
125 INJECTION, POWDER, LYOPHILIZED, FOR SOLUTION INTRAMUSCULAR; INTRAVENOUS ONCE
Status: COMPLETED | OUTPATIENT
Start: 2023-01-05 | End: 2023-01-05

## 2023-01-05 RX ORDER — SODIUM CHLORIDE 9 MG/ML
INJECTION, SOLUTION INTRAVENOUS ONCE
Status: COMPLETED | OUTPATIENT
Start: 2023-01-05 | End: 2023-01-05

## 2023-01-05 RX ORDER — DICYCLOMINE HCL 20 MG
20 TABLET ORAL 4 TIMES DAILY PRN
Qty: 15 TABLET | Refills: 0 | Status: SHIPPED | OUTPATIENT
Start: 2023-01-05 | End: 2023-01-11

## 2023-01-05 RX ORDER — FAMOTIDINE 10 MG/ML
20 INJECTION, SOLUTION INTRAVENOUS ONCE
Status: COMPLETED | OUTPATIENT
Start: 2023-01-05 | End: 2023-01-05

## 2023-01-05 RX ORDER — DIPHENHYDRAMINE HYDROCHLORIDE 50 MG/ML
25 INJECTION INTRAMUSCULAR; INTRAVENOUS ONCE
Status: COMPLETED | OUTPATIENT
Start: 2023-01-05 | End: 2023-01-05

## 2023-01-05 RX ORDER — FAMOTIDINE 20 MG/1
20 TABLET, FILM COATED ORAL 2 TIMES DAILY
Qty: 10 TABLET | Refills: 0 | Status: SHIPPED | OUTPATIENT
Start: 2023-01-05 | End: 2023-01-10

## 2023-01-05 NOTE — ED INITIAL ASSESSMENT (HPI)
Pt to ed w/ rash/itchiness to generalized body and RUQ abd pain onset Sunday noc, worsening sxs tonight.  Was seen [address] and given prednisone 1/03

## 2023-01-05 NOTE — DISCHARGE INSTRUCTIONS
TAKE PREDNISONE UNTIL FINISHED  BENADRYL 50 MG EVERY 8 HOURS FOR NEXT 3 DAYS  ENCOURAGE A BLAND DIET AT HOME  RETURN TO THE ED IF ANY OTHER PROBLEMS ARISE

## 2023-01-18 ENCOUNTER — PATIENT MESSAGE (OUTPATIENT)
Dept: FAMILY MEDICINE CLINIC | Facility: CLINIC | Age: 53
End: 2023-01-18

## 2023-01-18 ENCOUNTER — TELEPHONE (OUTPATIENT)
Dept: FAMILY MEDICINE CLINIC | Facility: CLINIC | Age: 53
End: 2023-01-18

## 2023-01-18 DIAGNOSIS — G47.30 SLEEP APNEA, UNSPECIFIED TYPE: Primary | ICD-10-CM

## 2023-01-18 NOTE — TELEPHONE ENCOUNTER
Pt was referred for sleep disorder but was not given a name or number. Please send info to pts mychart.

## 2023-02-14 ENCOUNTER — PATIENT MESSAGE (OUTPATIENT)
Dept: FAMILY MEDICINE CLINIC | Facility: CLINIC | Age: 53
End: 2023-02-14

## 2023-02-14 DIAGNOSIS — K21.9 GASTROESOPHAGEAL REFLUX DISEASE WITHOUT ESOPHAGITIS: ICD-10-CM

## 2023-02-14 DIAGNOSIS — G47.30 SLEEP APNEA, UNSPECIFIED TYPE: Primary | ICD-10-CM

## 2023-02-14 RX ORDER — PANTOPRAZOLE SODIUM 40 MG/1
TABLET, DELAYED RELEASE ORAL
Qty: 90 TABLET | Refills: 1 | OUTPATIENT
Start: 2023-02-14

## 2023-02-14 NOTE — TELEPHONE ENCOUNTER
From: Piter Vasquez  Sent: 2/14/2023 12:04 PM CST  To: [facility] Clinical Staff  Subject: Pulmonologist    Hello,     It appears that Dr. Tammy Brewer is within network. If you could please let me know when the referral is in place I would greatly appreciate it!      Thank you,    Dany Tony

## 2023-02-16 ENCOUNTER — HOSPITAL ENCOUNTER (OUTPATIENT)
Age: 53
Discharge: HOME | End: 2023-02-16
Attending: EMERGENCY MEDICINE
Payer: COMMERCIAL

## 2023-02-16 VITALS
DIASTOLIC BLOOD PRESSURE: 84 MMHG | TEMPERATURE: 99 F | BODY MASS INDEX: 29.55 KG/M2 | SYSTOLIC BLOOD PRESSURE: 125 MMHG | RESPIRATION RATE: 17 BRPM | WEIGHT: 195 LBS | HEIGHT: 68 IN | OXYGEN SATURATION: 96 % | HEART RATE: 73 BPM

## 2023-02-16 DIAGNOSIS — J06.9 VIRAL URI: ICD-10-CM

## 2023-02-16 DIAGNOSIS — K12.2 UVULITIS: Primary | ICD-10-CM

## 2023-02-16 LAB
S PYO AG THROAT QL IA.RAPID: NEGATIVE
SARS-COV-2 RNA RESP QL NAA+PROBE: NOT DETECTED

## 2023-02-16 PROCEDURE — 87651 STREP A DNA AMP PROBE: CPT | Performed by: EMERGENCY MEDICINE

## 2023-02-16 PROCEDURE — 99214 OFFICE O/P EST MOD 30 MIN: CPT

## 2023-02-16 PROCEDURE — 99213 OFFICE O/P EST LOW 20 MIN: CPT

## 2023-02-16 RX ORDER — DEXAMETHASONE 4 MG/1
10 TABLET ORAL ONCE
Status: COMPLETED | OUTPATIENT
Start: 2023-02-16 | End: 2023-02-16

## 2023-02-16 RX ORDER — DEXAMETHASONE 6 MG/1
6 TABLET ORAL 2 TIMES DAILY WITH MEALS
Qty: 4 TABLET | Refills: 0 | Status: SHIPPED | OUTPATIENT
Start: 2023-02-17 | End: 2023-02-19

## 2023-02-16 RX ORDER — FLUTICASONE PROPIONATE 50 MCG
2 SPRAY, SUSPENSION (ML) NASAL DAILY
Qty: 16 G | Refills: 0 | Status: SHIPPED | OUTPATIENT
Start: 2023-02-16 | End: 2023-03-18

## 2023-02-16 NOTE — DISCHARGE INSTRUCTIONS
Please gargle with warm salt water multiple times daily to help with pain and edema at the back of the throat. Decadron should start working within a couple of hours. He will have 2 more days to take at home. Return to the emergency department for difficulty breathing.

## 2023-03-07 DIAGNOSIS — K21.9 GASTROESOPHAGEAL REFLUX DISEASE, UNSPECIFIED WHETHER ESOPHAGITIS PRESENT: ICD-10-CM

## 2023-03-08 RX ORDER — OMEPRAZOLE 40 MG/1
CAPSULE, DELAYED RELEASE ORAL
Qty: 90 CAPSULE | Refills: 0 | Status: SHIPPED | OUTPATIENT
Start: 2023-03-08

## 2023-05-24 ENCOUNTER — OFFICE VISIT (OUTPATIENT)
Facility: CLINIC | Age: 53
End: 2023-05-24
Payer: COMMERCIAL

## 2023-05-24 VITALS
RESPIRATION RATE: 14 BRPM | OXYGEN SATURATION: 97 % | SYSTOLIC BLOOD PRESSURE: 120 MMHG | WEIGHT: 211.5 LBS | BODY MASS INDEX: 32.05 KG/M2 | HEIGHT: 68 IN | DIASTOLIC BLOOD PRESSURE: 68 MMHG | HEART RATE: 84 BPM

## 2023-05-24 DIAGNOSIS — J45.20 MILD INTERMITTENT ASTHMA WITHOUT COMPLICATION: ICD-10-CM

## 2023-05-24 DIAGNOSIS — G47.33 OBSTRUCTIVE SLEEP APNEA: ICD-10-CM

## 2023-05-24 DIAGNOSIS — R91.1 PULMONARY NODULE: Primary | ICD-10-CM

## 2023-05-24 PROCEDURE — 3078F DIAST BP <80 MM HG: CPT | Performed by: OTHER

## 2023-05-24 PROCEDURE — 3008F BODY MASS INDEX DOCD: CPT | Performed by: OTHER

## 2023-05-24 PROCEDURE — 3074F SYST BP LT 130 MM HG: CPT | Performed by: OTHER

## 2023-05-24 PROCEDURE — 99204 OFFICE O/P NEW MOD 45 MIN: CPT | Performed by: OTHER

## 2023-05-26 ENCOUNTER — OFFICE VISIT (OUTPATIENT)
Dept: SLEEP CENTER | Age: 53
End: 2023-05-26
Attending: Other
Payer: COMMERCIAL

## 2023-05-26 DIAGNOSIS — R91.1 PULMONARY NODULE: ICD-10-CM

## 2023-05-26 DIAGNOSIS — G47.33 OBSTRUCTIVE SLEEP APNEA: ICD-10-CM

## 2023-05-26 DIAGNOSIS — J45.20 MILD INTERMITTENT ASTHMA WITHOUT COMPLICATION: ICD-10-CM

## 2023-05-26 PROCEDURE — 95811 POLYSOM 6/>YRS CPAP 4/> PARM: CPT

## 2023-06-02 ENCOUNTER — PATIENT MESSAGE (OUTPATIENT)
Dept: FAMILY MEDICINE CLINIC | Facility: CLINIC | Age: 53
End: 2023-06-02

## 2023-06-05 ENCOUNTER — TELEPHONE (OUTPATIENT)
Facility: CLINIC | Age: 53
End: 2023-06-05

## 2023-06-05 DIAGNOSIS — R91.1 PULMONARY NODULE: Primary | ICD-10-CM

## 2023-06-05 DIAGNOSIS — G47.33 OBSTRUCTIVE SLEEP APNEA: ICD-10-CM

## 2023-06-05 NOTE — TELEPHONE ENCOUNTER
RECOMMENDATIONS:   1. The patient should be prescribed APAP at 7-12 cm H2O, with humidity at 5 and EPR on.   2. The patient was fitted with an 176 [facility] full face mask, size Medium. with side sleep only     Pt notified cpap machine ordered to [address] [place] [address]. DME will verify insurance and once approved will contact pt to arrange delivery and instructions. Pt instructed to follow up with Dr. Vicente Pittman once pt starts PAP therapy per insurance compliance requirement. Pt verbalized understanding of instructions and agrees with the plan. [phone] (home)   [facility] - [phone]    Nurse faxed office notes, and pap order. [employer] message sent to pt, need to know where pt had his sleep study.

## 2023-07-06 DIAGNOSIS — K21.9 GASTROESOPHAGEAL REFLUX DISEASE, UNSPECIFIED WHETHER ESOPHAGITIS PRESENT: ICD-10-CM

## 2023-07-07 RX ORDER — OMEPRAZOLE 40 MG/1
40 CAPSULE, DELAYED RELEASE ORAL DAILY
Qty: 90 CAPSULE | Refills: 0 | Status: SHIPPED | OUTPATIENT
Start: 2023-07-07

## 2023-07-18 ENCOUNTER — TELEPHONE (OUTPATIENT)
Facility: CLINIC | Age: 53
End: 2023-07-18

## 2023-07-18 NOTE — TELEPHONE ENCOUNTER
Dghtr called as pt is being told [facility] still needs pre-SS F2F to process order. Called to Referring hospital for info: [facility] [phone]  T>Ashley Moreno [phone]  [facility] [phone]  T>Licha Charge Nurse [phone]     \"Ramandeep\" was able to confirm pt did complete diagnostic sleep study 7- and had a pre-study consult with \"proper notes\" on 5- with a physician no longer affiliated with [facility]. She could not disclose any addt'l info. She recommended the pt himself come down to Medical Records there to specifically request these documents in person with a signed authorization. Called to dghtr who will confirm the needs to pt who will have them faxed and will also request a hard copy for him to bring to us if needed.

## 2023-08-02 ENCOUNTER — OFFICE VISIT (OUTPATIENT)
Dept: FAMILY MEDICINE CLINIC | Facility: CLINIC | Age: 53
End: 2023-08-02
Payer: COMMERCIAL

## 2023-08-02 VITALS
OXYGEN SATURATION: 97 % | HEART RATE: 91 BPM | WEIGHT: 209 LBS | SYSTOLIC BLOOD PRESSURE: 112 MMHG | HEIGHT: 68 IN | BODY MASS INDEX: 31.67 KG/M2 | DIASTOLIC BLOOD PRESSURE: 72 MMHG | RESPIRATION RATE: 16 BRPM

## 2023-08-02 DIAGNOSIS — Z23 NEED FOR SHINGLES VACCINE: ICD-10-CM

## 2023-08-02 DIAGNOSIS — R73.9 HYPERGLYCEMIA: ICD-10-CM

## 2023-08-02 DIAGNOSIS — R79.89 LOW TESTOSTERONE: ICD-10-CM

## 2023-08-02 DIAGNOSIS — J45.20 MILD INTERMITTENT ASTHMA WITHOUT COMPLICATION: ICD-10-CM

## 2023-08-02 DIAGNOSIS — Z00.00 WELLNESS EXAMINATION: Primary | ICD-10-CM

## 2023-08-02 DIAGNOSIS — F41.9 ANXIETY: ICD-10-CM

## 2023-08-02 DIAGNOSIS — K21.9 GASTROESOPHAGEAL REFLUX DISEASE, UNSPECIFIED WHETHER ESOPHAGITIS PRESENT: ICD-10-CM

## 2023-08-02 DIAGNOSIS — Z12.11 SCREENING FOR COLON CANCER: ICD-10-CM

## 2023-08-02 DIAGNOSIS — N52.9 ERECTILE DYSFUNCTION, UNSPECIFIED ERECTILE DYSFUNCTION TYPE: ICD-10-CM

## 2023-08-02 LAB
CARTRIDGE LOT#: 603 NUMERIC
HEMOGLOBIN A1C: 5.6 % (ref 4.3–5.6)

## 2023-08-02 PROCEDURE — 83036 HEMOGLOBIN GLYCOSYLATED A1C: CPT | Performed by: FAMILY MEDICINE

## 2023-08-02 PROCEDURE — 3008F BODY MASS INDEX DOCD: CPT | Performed by: FAMILY MEDICINE

## 2023-08-02 PROCEDURE — G0438 PPPS, INITIAL VISIT: HCPCS | Performed by: FAMILY MEDICINE

## 2023-08-02 PROCEDURE — 99214 OFFICE O/P EST MOD 30 MIN: CPT | Performed by: FAMILY MEDICINE

## 2023-08-02 PROCEDURE — 90471 IMMUNIZATION ADMIN: CPT | Performed by: FAMILY MEDICINE

## 2023-08-02 PROCEDURE — 99396 PREV VISIT EST AGE 40-64: CPT | Performed by: FAMILY MEDICINE

## 2023-08-02 PROCEDURE — 3078F DIAST BP <80 MM HG: CPT | Performed by: FAMILY MEDICINE

## 2023-08-02 PROCEDURE — 3074F SYST BP LT 130 MM HG: CPT | Performed by: FAMILY MEDICINE

## 2023-08-02 PROCEDURE — 90750 HZV VACC RECOMBINANT IM: CPT | Performed by: FAMILY MEDICINE

## 2023-08-02 RX ORDER — SILDENAFIL 50 MG/1
50 TABLET, FILM COATED ORAL
Qty: 10 TABLET | Refills: 2 | Status: SHIPPED | OUTPATIENT
Start: 2023-08-02

## 2023-08-07 ENCOUNTER — LAB ENCOUNTER (OUTPATIENT)
Dept: LAB | Age: 53
End: 2023-08-07
Attending: FAMILY MEDICINE
Payer: COMMERCIAL

## 2023-08-07 DIAGNOSIS — R79.89 LOW TESTOSTERONE: ICD-10-CM

## 2023-08-07 PROCEDURE — 84403 ASSAY OF TOTAL TESTOSTERONE: CPT

## 2023-08-07 PROCEDURE — 36415 COLL VENOUS BLD VENIPUNCTURE: CPT

## 2023-08-07 PROCEDURE — 84402 ASSAY OF FREE TESTOSTERONE: CPT

## 2023-08-11 ENCOUNTER — TELEPHONE (OUTPATIENT)
Facility: CLINIC | Age: 53
End: 2023-08-11

## 2023-08-11 NOTE — TELEPHONE ENCOUNTER
Daughter called wanted to see the status on cpap machine. Called [facility] for an update sent sleep study to them and they will call pt asap     Sleep study was faxed to [address] was called to be informed that they never received sleep test,Sleep Study was re faxed to them and they should be getting a call from [facility] to proceed with C-PAP.

## 2023-08-14 ENCOUNTER — MED REC SCAN ONLY (OUTPATIENT)
Facility: CLINIC | Age: 53
End: 2023-08-14

## 2023-08-14 LAB
FREE TESTOST DIRECT: 8.3 PG/ML
TESTOSTERONE: 500 NG/DL

## 2023-08-31 ENCOUNTER — PATIENT MESSAGE (OUTPATIENT)
Dept: FAMILY MEDICINE CLINIC | Facility: CLINIC | Age: 53
End: 2023-08-31

## 2023-08-31 DIAGNOSIS — R91.1 PULMONARY NODULE: Primary | ICD-10-CM

## 2023-10-11 ENCOUNTER — OFFICE VISIT (OUTPATIENT)
Facility: CLINIC | Age: 53
End: 2023-10-11
Payer: COMMERCIAL

## 2023-10-11 VITALS
HEART RATE: 72 BPM | SYSTOLIC BLOOD PRESSURE: 120 MMHG | WEIGHT: 213 LBS | DIASTOLIC BLOOD PRESSURE: 80 MMHG | BODY MASS INDEX: 32.28 KG/M2 | HEIGHT: 68 IN | OXYGEN SATURATION: 98 % | RESPIRATION RATE: 18 BRPM

## 2023-10-11 DIAGNOSIS — J45.20 MILD INTERMITTENT ASTHMA WITHOUT COMPLICATION: ICD-10-CM

## 2023-10-11 DIAGNOSIS — G47.01 INSOMNIA DUE TO MEDICAL CONDITION: ICD-10-CM

## 2023-10-11 DIAGNOSIS — G47.33 OBSTRUCTIVE SLEEP APNEA: Primary | ICD-10-CM

## 2023-10-11 PROCEDURE — 3079F DIAST BP 80-89 MM HG: CPT | Performed by: OTHER

## 2023-10-11 PROCEDURE — 3074F SYST BP LT 130 MM HG: CPT | Performed by: OTHER

## 2023-10-11 PROCEDURE — 3008F BODY MASS INDEX DOCD: CPT | Performed by: OTHER

## 2023-10-11 PROCEDURE — 99214 OFFICE O/P EST MOD 30 MIN: CPT | Performed by: OTHER

## 2023-10-11 NOTE — PATIENT INSTRUCTIONS
Vinicius Maya MD  4.4  22 [employer] reviews  Otolaryngology clinic in [facility]  Address: [address] [facility], [place], [address] [place] [address]  Phone: [phone]      DANY mask option

## 2023-10-14 DIAGNOSIS — K21.9 GASTROESOPHAGEAL REFLUX DISEASE, UNSPECIFIED WHETHER ESOPHAGITIS PRESENT: ICD-10-CM

## 2023-10-16 RX ORDER — OMEPRAZOLE 40 MG/1
40 CAPSULE, DELAYED RELEASE ORAL DAILY
Qty: 90 CAPSULE | Refills: 0 | Status: SHIPPED | OUTPATIENT
Start: 2023-10-16

## 2023-10-16 NOTE — TELEPHONE ENCOUNTER
Medication(s) to Refill:   Requested Prescriptions     Pending Prescriptions Disp Refills    OMEPRAZOLE 40 MG Oral Capsule Delayed Release [Pharmacy Med Name: OMEPRAZOLE 40MG CAPSULES] 90 capsule 0     Sig: TAKE 1 CAPSULE(40 MG) BY MOUTH DAILY         Reason for Medication Refill being sent to Provider / Reason Protocol Failed:  [] 90 day refill has already been granted  [] Blood Pressure out of range  [] Labs Abnormal/over due  [] Medication not previously prescribed by Provider  [x] Non-Protocol Medication  [] Controlled Substance   [] Due for appointment- no future appointment scheduled  [] No Follow up specified      Last Time Medication was Filled:  7/7/2023      Last Office Visit with PCP: 8/2/2023    When Patient was Due Back to the Office:  3 months   (from when PCP last addressed condition)    Future Appointments:  No future appointments.       Last Blood Pressures:  BP Readings from Last 2 Encounters:  10/11/23 : 120/80  08/02/23 : 112/72    Action taken:  [] Refill approved per protocol  [x] Routing to provider for approval

## 2023-11-07 ENCOUNTER — OFFICE VISIT (OUTPATIENT)
Facility: LOCATION | Age: 53
End: 2023-11-07
Payer: COMMERCIAL

## 2023-11-07 DIAGNOSIS — G47.33 OBSTRUCTIVE SLEEP APNEA: ICD-10-CM

## 2023-11-07 DIAGNOSIS — M95.0 NASAL VALVE COLLAPSE: ICD-10-CM

## 2023-11-07 DIAGNOSIS — J34.2 DEVIATED SEPTUM: Primary | ICD-10-CM

## 2023-11-07 PROCEDURE — 31231 NASAL ENDOSCOPY DX: CPT | Performed by: OTOLARYNGOLOGY

## 2023-11-07 PROCEDURE — 99204 OFFICE O/P NEW MOD 45 MIN: CPT | Performed by: OTOLARYNGOLOGY

## 2023-11-07 NOTE — PROGRESS NOTES
Sandi Skinner is a 47year old male. Chief Complaint   Patient presents with    Sinus Problem    Sleep Apnea     HPI:   He has a history of sleep apnea. He is not able to tolerate CPAP. He has chronic nasal airway obstruction since having a significant accident at age 8. He has tried Flonase without relief. He denies associated sinusitis. Current Outpatient Medications   Medication Sig Dispense Refill    Omeprazole 40 MG Oral Capsule Delayed Release Take 1 capsule (40 mg total) by mouth daily. 90 capsule 0    Sildenafil Citrate 50 MG Oral Tab Take 1 tablet (50 mg total) by mouth daily as needed for Erectile Dysfunction. 10 tablet 2    albuterol 108 (90 Base) MCG/ACT Inhalation Aero Soln Inhale 2 puffs into the lungs every 6 (six) hours as needed for Wheezing or Shortness of Breath. 1 each 2    acetaminophen 325 MG Oral Tab Take 1 tablet (325 mg total) by mouth every 6 (six) hours as needed for Pain. Past Medical History:   Diagnosis Date    Asthma     Esophageal reflux       Social History:  Social History     Socioeconomic History    Marital status:    Tobacco Use    Smoking status: Former    Smokeless tobacco: Never   Vaping Use    Vaping Use: Never used   Substance and Sexual Activity    Alcohol use: Yes     Comment: rare    Drug use: Never      Past Surgical History:   Procedure Laterality Date    REPAIR ING HERNIA,5+Y/O,REDUCIBL           REVIEW OF SYSTEMS:   GENERAL HEALTH: feels well otherwise  GENERAL : denies fever, chills, sweats, weight loss, weight gain  SKIN: denies any unusual skin lesions or rashes  RESPIRATORY: denies shortness of breath with exertion  NEURO: denies headaches    EXAM:   There were no vitals taken for this visit. System Findings Details   Constitutional  Overall appearance - Normal.   Psychiatric  Orientation - Oriented to time, place, person & situation. Appropriate mood and affect.    Head/Face  Facial features -- Normal. Skull - Normal.   Eyes  Pupils equal ,round ,react to light and accomidate   Ears, Nose, Throat, Neck  Ears are clear nose reveals nasal valve collapse bilaterally oropharynx reveals pharyngeal narrowing neck no masses   Neurological  Memory - Normal. Cranial nerves - Cranial nerves II through XII grossly intact. Lymph Detail  Submental. Submandibular. Anterior cervical. Posterior cervical. Supraclavicular. Procedure:  Due to inability for adequate examination of the nasal cavity and nasopharynx and need for magnification to perform the examination, endoscopy was offered. The nasal endoscope was utilized as it was imperative to inspect the interior of the nasal cavity and the middle and superior meatus along with the turbinates and sphenoethmoid recess. Risks and benefits were discussed with patient/family and they have given consent to proceed. A rigid zero degree scope was inserted. Findings: The anterior of the nasal cavity along with the middle and superior meatus and turbinates and the sphenoethmoid recess were evaluated. There is nasal septal deviation to the left. There is a narrow nasal vault with turbinate hypertrophy and nasal valve collapse. ASSESSMENT AND PLAN:   1. Deviated septum  Causing chronic nasal airway obstruction. He is tried Flonase without relief. He cannot tolerate CPAP therapy. He will likely need surgical intervention to correct this problem. This would include septoplasty reduction of the turbinates and Latera to repair nasal valve collapse. The risk benefits and alternatives of sinus surgery were explained to the patient. The risks are to include but not limited to bleeding infection ocular brain injury scar band formation and nonresolution of symptoms. 2. Nasal valve collapse      3. Obstructive sleep apnea  Hopefully with opening his nasal airway he will better be able to tolerate CPAP therapy. The patient indicates understanding of these issues and agrees to the plan.     No follow-ups on file.    Jonathan Be MD  11/7/2023  5:42 PM

## 2023-11-09 ENCOUNTER — TELEPHONE (OUTPATIENT)
Facility: LOCATION | Age: 53
End: 2023-11-09

## 2023-11-09 DIAGNOSIS — J34.3 HYPERTROPHY OF NASAL TURBINATES: ICD-10-CM

## 2023-11-09 DIAGNOSIS — M95.0 NASAL VALVE COLLAPSE: ICD-10-CM

## 2023-11-09 DIAGNOSIS — J34.2 DEVIATED NASAL SEPTUM: Primary | ICD-10-CM

## 2023-11-28 DIAGNOSIS — J34.3 HYPERTROPHY OF NASAL TURBINATES: ICD-10-CM

## 2023-11-28 DIAGNOSIS — J34.2 DEVIATED NASAL SEPTUM: Primary | ICD-10-CM

## 2023-12-01 ENCOUNTER — TELEPHONE (OUTPATIENT)
Dept: FAMILY MEDICINE CLINIC | Facility: CLINIC | Age: 53
End: 2023-12-01

## 2023-12-18 ENCOUNTER — HOSPITAL ENCOUNTER (OUTPATIENT)
Facility: HOSPITAL | Age: 53
Setting detail: HOSPITAL OUTPATIENT SURGERY
Discharge: HOME | End: 2023-12-18
Attending: OTOLARYNGOLOGY | Admitting: OTOLARYNGOLOGY
Payer: COMMERCIAL

## 2023-12-18 ENCOUNTER — ANESTHESIA (OUTPATIENT)
Dept: SURGERY | Facility: HOSPITAL | Age: 53
End: 2023-12-18
Payer: COMMERCIAL

## 2023-12-18 ENCOUNTER — ANESTHESIA EVENT (OUTPATIENT)
Dept: SURGERY | Facility: HOSPITAL | Age: 53
End: 2023-12-18
Payer: COMMERCIAL

## 2023-12-18 VITALS
SYSTOLIC BLOOD PRESSURE: 134 MMHG | TEMPERATURE: 97 F | HEART RATE: 91 BPM | BODY MASS INDEX: 31.07 KG/M2 | RESPIRATION RATE: 14 BRPM | HEIGHT: 68 IN | DIASTOLIC BLOOD PRESSURE: 83 MMHG | OXYGEN SATURATION: 96 % | WEIGHT: 205 LBS

## 2023-12-18 PROCEDURE — 09TL7ZZ RESECTION OF NASAL TURBINATE, VIA NATURAL OR ARTIFICIAL OPENING: ICD-10-PCS | Performed by: OTOLARYNGOLOGY

## 2023-12-18 PROCEDURE — 09SM0ZZ REPOSITION NASAL SEPTUM, OPEN APPROACH: ICD-10-PCS | Performed by: OTOLARYNGOLOGY

## 2023-12-18 PROCEDURE — 30468 RPR NSL VLV COLLAPSE W/IMPLT: CPT | Performed by: OTOLARYNGOLOGY

## 2023-12-18 PROCEDURE — 09HK7YZ INSERTION OF OTHER DEVICE INTO NASAL MUCOSA AND SOFT TISSUE, VIA NATURAL OR ARTIFICIAL OPENING: ICD-10-PCS | Performed by: OTOLARYNGOLOGY

## 2023-12-18 PROCEDURE — 30140 RESECT INFERIOR TURBINATE: CPT | Performed by: OTOLARYNGOLOGY

## 2023-12-18 PROCEDURE — 30520 REPAIR OF NASAL SEPTUM: CPT | Performed by: OTOLARYNGOLOGY

## 2023-12-18 DEVICE — ABSORBABLE NASAL IMPLANTS 20MM
Type: IMPLANTABLE DEVICE | Site: NOSE | Status: FUNCTIONAL
Brand: LATERA

## 2023-12-18 RX ORDER — LIDOCAINE HYDROCHLORIDE AND EPINEPHRINE 10; 10 MG/ML; UG/ML
INJECTION, SOLUTION INFILTRATION; PERINEURAL AS NEEDED
Status: DISCONTINUED | OUTPATIENT
Start: 2023-12-18 | End: 2023-12-18 | Stop reason: HOSPADM

## 2023-12-18 RX ORDER — PHENYLEPHRINE HCL 10 MG/ML
VIAL (ML) INJECTION AS NEEDED
Status: DISCONTINUED | OUTPATIENT
Start: 2023-12-18 | End: 2023-12-18 | Stop reason: SURG

## 2023-12-18 RX ORDER — LIDOCAINE HYDROCHLORIDE 10 MG/ML
INJECTION, SOLUTION EPIDURAL; INFILTRATION; INTRACAUDAL; PERINEURAL AS NEEDED
Status: DISCONTINUED | OUTPATIENT
Start: 2023-12-18 | End: 2023-12-18 | Stop reason: SURG

## 2023-12-18 RX ORDER — SCOLOPAMINE TRANSDERMAL SYSTEM 1 MG/1
1 PATCH, EXTENDED RELEASE TRANSDERMAL ONCE
Status: DISCONTINUED | OUTPATIENT
Start: 2023-12-18 | End: 2023-12-18 | Stop reason: HOSPADM

## 2023-12-18 RX ORDER — NALOXONE HYDROCHLORIDE 0.4 MG/ML
0.08 INJECTION, SOLUTION INTRAMUSCULAR; INTRAVENOUS; SUBCUTANEOUS AS NEEDED
Status: DISCONTINUED | OUTPATIENT
Start: 2023-12-18 | End: 2023-12-18

## 2023-12-18 RX ORDER — HYDROCODONE BITARTRATE AND ACETAMINOPHEN 5; 325 MG/1; MG/1
2 TABLET ORAL ONCE AS NEEDED
Status: COMPLETED | OUTPATIENT
Start: 2023-12-18 | End: 2023-12-18

## 2023-12-18 RX ORDER — HYDROCODONE BITARTRATE AND ACETAMINOPHEN 5; 325 MG/1; MG/1
1-2 TABLET ORAL EVERY 4 HOURS PRN
Qty: 15 TABLET | Refills: 0 | Status: SHIPPED | OUTPATIENT
Start: 2023-12-18

## 2023-12-18 RX ORDER — ACETAMINOPHEN 500 MG
1000 TABLET ORAL ONCE
Status: DISCONTINUED | OUTPATIENT
Start: 2023-12-18 | End: 2023-12-18 | Stop reason: HOSPADM

## 2023-12-18 RX ORDER — DEXAMETHASONE SODIUM PHOSPHATE 4 MG/ML
VIAL (ML) INJECTION AS NEEDED
Status: DISCONTINUED | OUTPATIENT
Start: 2023-12-18 | End: 2023-12-18 | Stop reason: SURG

## 2023-12-18 RX ORDER — HYDROCODONE BITARTRATE AND ACETAMINOPHEN 5; 325 MG/1; MG/1
1 TABLET ORAL ONCE AS NEEDED
Status: COMPLETED | OUTPATIENT
Start: 2023-12-18 | End: 2023-12-18

## 2023-12-18 RX ORDER — ROCURONIUM BROMIDE 10 MG/ML
INJECTION, SOLUTION INTRAVENOUS AS NEEDED
Status: DISCONTINUED | OUTPATIENT
Start: 2023-12-18 | End: 2023-12-18 | Stop reason: SURG

## 2023-12-18 RX ORDER — SODIUM CHLORIDE, SODIUM LACTATE, POTASSIUM CHLORIDE, CALCIUM CHLORIDE 600; 310; 30; 20 MG/100ML; MG/100ML; MG/100ML; MG/100ML
INJECTION, SOLUTION INTRAVENOUS CONTINUOUS
Status: DISCONTINUED | OUTPATIENT
Start: 2023-12-18 | End: 2023-12-18

## 2023-12-18 RX ORDER — CLARITHROMYCIN 500 MG/1
500 TABLET, COATED ORAL 2 TIMES DAILY
Qty: 20 TABLET | Refills: 0 | Status: SHIPPED | OUTPATIENT
Start: 2023-12-18

## 2023-12-18 RX ORDER — HYDROMORPHONE HYDROCHLORIDE 1 MG/ML
0.2 INJECTION, SOLUTION INTRAMUSCULAR; INTRAVENOUS; SUBCUTANEOUS EVERY 5 MIN PRN
Status: DISCONTINUED | OUTPATIENT
Start: 2023-12-18 | End: 2023-12-18

## 2023-12-18 RX ORDER — ACETAMINOPHEN 500 MG
1000 TABLET ORAL ONCE AS NEEDED
Status: COMPLETED | OUTPATIENT
Start: 2023-12-18 | End: 2023-12-18

## 2023-12-18 RX ORDER — PROCHLORPERAZINE EDISYLATE 5 MG/ML
5 INJECTION INTRAMUSCULAR; INTRAVENOUS EVERY 8 HOURS PRN
Status: DISCONTINUED | OUTPATIENT
Start: 2023-12-18 | End: 2023-12-18

## 2023-12-18 RX ORDER — MIDAZOLAM HYDROCHLORIDE 1 MG/ML
INJECTION INTRAMUSCULAR; INTRAVENOUS AS NEEDED
Status: DISCONTINUED | OUTPATIENT
Start: 2023-12-18 | End: 2023-12-18 | Stop reason: SURG

## 2023-12-18 RX ORDER — ONDANSETRON 2 MG/ML
4 INJECTION INTRAMUSCULAR; INTRAVENOUS EVERY 6 HOURS PRN
Status: DISCONTINUED | OUTPATIENT
Start: 2023-12-18 | End: 2023-12-18

## 2023-12-18 RX ORDER — HYDROMORPHONE HYDROCHLORIDE 1 MG/ML
0.4 INJECTION, SOLUTION INTRAMUSCULAR; INTRAVENOUS; SUBCUTANEOUS EVERY 5 MIN PRN
Status: DISCONTINUED | OUTPATIENT
Start: 2023-12-18 | End: 2023-12-18

## 2023-12-18 RX ORDER — ONDANSETRON 2 MG/ML
INJECTION INTRAMUSCULAR; INTRAVENOUS AS NEEDED
Status: DISCONTINUED | OUTPATIENT
Start: 2023-12-18 | End: 2023-12-18 | Stop reason: SURG

## 2023-12-18 RX ORDER — HYDROMORPHONE HYDROCHLORIDE 1 MG/ML
0.6 INJECTION, SOLUTION INTRAMUSCULAR; INTRAVENOUS; SUBCUTANEOUS EVERY 5 MIN PRN
Status: DISCONTINUED | OUTPATIENT
Start: 2023-12-18 | End: 2023-12-18

## 2023-12-18 RX ORDER — IBUPROFEN 600 MG/1
600 TABLET ORAL ONCE AS NEEDED
Status: DISCONTINUED | OUTPATIENT
Start: 2023-12-18 | End: 2023-12-18

## 2023-12-18 RX ORDER — HYDROMORPHONE HYDROCHLORIDE 1 MG/ML
INJECTION, SOLUTION INTRAMUSCULAR; INTRAVENOUS; SUBCUTANEOUS
Status: COMPLETED
Start: 2023-12-18 | End: 2023-12-18

## 2023-12-18 RX ADMIN — PHENYLEPHRINE HCL 50 MCG: 10 MG/ML VIAL (ML) INJECTION at 13:10:00

## 2023-12-18 RX ADMIN — DEXAMETHASONE SODIUM PHOSPHATE 4 MG: 4 MG/ML VIAL (ML) INJECTION at 13:00:00

## 2023-12-18 RX ADMIN — MIDAZOLAM HYDROCHLORIDE 2 MG: 1 INJECTION INTRAMUSCULAR; INTRAVENOUS at 12:52:00

## 2023-12-18 RX ADMIN — LIDOCAINE HYDROCHLORIDE 25 MG: 10 INJECTION, SOLUTION EPIDURAL; INFILTRATION; INTRACAUDAL; PERINEURAL at 12:54:00

## 2023-12-18 RX ADMIN — SODIUM CHLORIDE, SODIUM LACTATE, POTASSIUM CHLORIDE, CALCIUM CHLORIDE: 600; 310; 30; 20 INJECTION, SOLUTION INTRAVENOUS at 13:42:00

## 2023-12-18 RX ADMIN — SODIUM CHLORIDE, SODIUM LACTATE, POTASSIUM CHLORIDE, CALCIUM CHLORIDE: 600; 310; 30; 20 INJECTION, SOLUTION INTRAVENOUS at 13:01:00

## 2023-12-18 RX ADMIN — ONDANSETRON 4 MG: 2 INJECTION INTRAMUSCULAR; INTRAVENOUS at 13:01:00

## 2023-12-18 RX ADMIN — ROCURONIUM BROMIDE 10 MG: 10 INJECTION, SOLUTION INTRAVENOUS at 12:55:00

## 2023-12-18 NOTE — ANESTHESIA POSTPROCEDURE EVALUATION
[address] Patient Status:  Hospital Outpatient Surgery   Age/Gender 47year old male MRN [id number]   [facility] SURGERY Attending Vero Larios MD   Hosp Day # 0 PCP Hero Smiley MD       Anesthesia Post-op Note    Nasal Septoplasty; Bilateral Out Fracture of Inferior Turbinates, Bilateral Submucous Resection of Inferior Turbinates    Procedure Summary       Date: 12/18/23 Room / Location: [address] [place] [address] OR 02 / [address] OR    Anesthesia Start: 8229 Anesthesia Stop: 2709    Procedure: Nasal Septoplasty; Bilateral Out Fracture of Inferior Turbinates, Bilateral Submucous Resection of Inferior Turbinates (Bilateral: Nose) Diagnosis:       Deviated nasal septum      Hypertrophy of nasal turbinates      Nasal valve collapse      (Deviated nasal septum [X97. 2]Hypertrophy of nasal turbinates [J34. 3]Nasal valve collapse [M95.0])    Surgeons: Vero Larios MD Anesthesiologist: Delroy Polk MD    Anesthesia Type: general ASA Status: 2            Anesthesia Type: general    Vitals Value Taken Time   BP 84/55 12/18/23 1342   Temp 97 12/18/23 1344   Pulse 118 12/18/23 1343   Resp 20 12/18/23 1343   SpO2 93 % 12/18/23 1343   Vitals shown include unfiled device data.         Nausea/Vomiting: none

## 2023-12-18 NOTE — ANESTHESIA POSTPROCEDURE EVALUATION
[address] Patient Status:  Hospital Outpatient Surgery   Age/Gender 47year old male MRN [id number]   [facility] SURGERY Attending Keerthi Murrell MD   Hosp Day # 0 PCP Allayne Romberg, MD       Anesthesia Post-op Note    Nasal Septoplasty; Bilateral Out Fracture of Inferior Turbinates, Bilateral Submucous Resection of Inferior Turbinates    Procedure Summary       Date: 12/18/23 Room / Location: [facility] OR 02 / [facility] OR    Anesthesia Start: 2346 Anesthesia Stop:     Procedure: Nasal Septoplasty; Bilateral Out Fracture of Inferior Turbinates, Bilateral Submucous Resection of Inferior Turbinates (Bilateral: Nose) Diagnosis:       Deviated nasal septum      Hypertrophy of nasal turbinates      Nasal valve collapse      (Deviated nasal septum [J05. 2]Hypertrophy of nasal turbinates [J34. 3]Nasal valve collapse [M95.0])    Surgeons: Keerthi Murrell MD Anesthesiologist: Nisha Dow MD    Anesthesia Type: general ASA Status: 2            Anesthesia Type: general    Vitals Value Taken Time   BP 84/55 12/18/23 1342   Temp 97 12/18/23 1343   Pulse 109 12/18/23 1342   Resp 22 12/18/23 1342   SpO2 94 % 12/18/23 1342   Vitals shown include unfiled device data. Patient Location: PACU    Anesthesia Type: general    Airway Patency: patent    Postop Pain Control: adequate    Mental Status: mildly sedated but able to meaningfully participate in the post-anesthesia evaluation    Nausea/Vomiting: none    Cardiopulmonary/Hydration status: stable euvolemic    Complications: no apparent anesthesia related complications    Postop vital signs: stable    Dental Exam: Unchanged from Preop    Patient to be discharged from PACU when criteria met.

## 2023-12-18 NOTE — OPERATIVE REPORT
[facility]  Operative Note    Mey Cheek Location: OR   [facility] [id number] MRN [id number]   Admission Date 12/18/2023 Operation Date 12/18/2023   Attending Physician Ether Simmonds, MD Operating Physician Vinicius Maya MD       OPERATIVE REPORT   PREOPERATIVE DIAGNOSIS:   1. Nasal septal deviation. 2. Inferior turbinate hypertrophy. POSTOPERATIVE DIAGNOSIS:   1. Nasal septal deviation. 2. Inferior turbinate hypertrophy. 3.  Nasal valve collapse  PROCEDURE PERFORMED:   1. Nasal septoplasty. 2. Outfracture and shaver submucous reduction of inferior turbinates. 3.  Nasal valve collapse  ANESTHESIA: General endotracheal.   PROCEDURE AND FINDINGS: After satisfactory general endotracheal anesthesia induction, the patient was prepared for the procedure, and 1% lidocaine with epinephrine was injected at the lateral nasal wall, the inferior turbinates, and the nasal septum. Next, 4% cocaine packs were placed into both sides of the nose. The area was then prepped and draped in the usual sterile fashion. A #15-blade was used to make an incision at the nasal septum. A mucoperichondrial flap was then raised using a Jhonny elevator. An incision was made at the bony cartilage junction with the Jhonny elevator, then a mucoperiosteal flap was raised on the other side. Deviated nasal vomer and ethmoid bone were removed using William forceps. Inferiorly, the maxillary crest spur was removed with an osteotome. The septal mucosal edges were then closed with the septal stapler. A small drainage incision was made posteriorly. Attention was turned to the turbinates. Each inferior turbinate was outfractured with a #7 osteotome. The shaver was then used to make submucosal tunnels in each inferior turbinate removing submucosa and bone, thereby reducing their size. The patient was marked bilaterally for Latera.   Latera applicator was then advanced perpendicular to the nasal septum and above the lower lateral cartilage. It was then turned superiorly and a tunnel was made towards the medial canthus on the left side until the tip of it was resting over the left nasal bone. The applicator was then fired and then slowly removed thereby placing the Latera stent. Same procedure was performed on the right-hand side without difficulty. At this point, the procedure ended. Packing was placed into both sides of the nose. The patient was then awakened, extubated, and transferred to the recovery room in stable condition. FINDINGS: Patient had deviated septum to the right. He had bilateral nasal valve collapse.     Gregoria Oliveros MD

## 2023-12-18 NOTE — ANESTHESIA PROCEDURE NOTES
Airway  Date/Time: 12/18/2023 12:57 PM  Urgency: elective      General Information and Staff    Patient location during procedure: OR  Anesthesiologist: Renata Quezada MD  Performed: anesthesiologist   Performed by: Renata Quezada MD  Authorized by: Renata Quezada MD      Indications and Patient Condition  Indications for airway management: anesthesia  Sedation level: deep  Preoxygenated: yes  Patient position: sniffing  Mask difficulty assessment: 1 - vent by mask    Final Airway Details  Final airway type: endotracheal airway      Successful airway: ETT  Cuffed: yes   Successful intubation technique: direct laryngoscopy  Endotracheal tube insertion site: oral  Blade: Mason  Blade size: #4  ETT size (mm): 7.0    Placement verified by: capnometry   Measured from: lips  Number of attempts at approach: 1

## 2023-12-18 NOTE — INTERVAL H&P NOTE
Pre-op Diagnosis: Deviated nasal septum [J34.2]  Hypertrophy of nasal turbinates [J34.3]  Nasal valve collapse [M95.0]    The above referenced H&P was reviewed by Vinicius Maya MD on 12/18/2023, the patient was examined and no significant changes have occurred in the patient's condition since the H&P was performed. I discussed with the patient and/or legal representative the potential benefits, risks and side effects of this procedure; the likelihood of the patient achieving goals; and potential problems that might occur during recuperation. I discussed reasonable alternatives to the procedure, including risks, benefits and side effects related to the alternatives and risks related to not receiving this procedure. We will proceed with procedure as planned.

## 2023-12-27 ENCOUNTER — OFFICE VISIT (OUTPATIENT)
Facility: LOCATION | Age: 53
End: 2023-12-27
Payer: COMMERCIAL

## 2023-12-27 DIAGNOSIS — J34.2 DEVIATED NASAL SEPTUM: Primary | ICD-10-CM

## 2023-12-27 DIAGNOSIS — M95.0 NASAL VALVE COLLAPSE: ICD-10-CM

## 2023-12-27 PROCEDURE — 99024 POSTOP FOLLOW-UP VISIT: CPT | Performed by: OTOLARYNGOLOGY

## 2023-12-27 RX ORDER — FLUTICASONE PROPIONATE 50 MCG
2 SPRAY, SUSPENSION (ML) NASAL DAILY
Qty: 16 G | Refills: 3 | Status: SHIPPED | OUTPATIENT
Start: 2023-12-27

## 2023-12-27 NOTE — PROGRESS NOTES
Hiren Castrejon is a 46year old male. Chief Complaint   Patient presents with    Post-Op     HPI:   He is postop septoplasty turbinectomy Latera for nasal valve collapse. REVIEW OF SYSTEMS:   GENERAL HEALTH: feels well otherwise  GENERAL : denies fever, chills, sweats, weight loss, weight gain  SKIN: denies any unusual skin lesions or rashes  RESPIRATORY: denies shortness of breath with exertion  NEURO: denies headaches    EXAM:   There were no vitals taken for this visit. System Findings Details   Constitutional  Overall appearance - Normal.   Psychiatric  Orientation - Oriented to time, place, person & situation. Appropriate mood and affect. Head/Face  Facial features -- Normal. Skull - Normal.   Eyes  Pupils equal ,round ,react to light and accomidate   Ears, Nose, Throat, Neck  Septum and nose is healing well. Debris was cleaned out today without difficulty. Neurological  Memory - Normal. Cranial nerves - Cranial nerves II through XII grossly intact. Lymph Detail  Submental. Submandibular. Anterior cervical. Posterior cervical. Supraclavicular. ASSESSMENT AND PLAN:   1. Deviated nasal septum  Status post septoplasty turbinectomy and Latera for nasal valve collapse healing well. He is 1 week postop. He will continue with nasal saline. He will add Flonase each day. He will see me back in 1 month if there is still symptoms present. 2. Nasal valve collapse        The patient indicates understanding of these issues and agrees to the plan. No follow-ups on file.     Johnnie Andrew MD  12/27/2023  10:30 AM

## 2024-01-11 DIAGNOSIS — K21.9 GASTROESOPHAGEAL REFLUX DISEASE, UNSPECIFIED WHETHER ESOPHAGITIS PRESENT: ICD-10-CM

## 2024-01-11 RX ORDER — OMEPRAZOLE 40 MG/1
40 CAPSULE, DELAYED RELEASE ORAL DAILY
Qty: 90 CAPSULE | Refills: 0 | Status: SHIPPED | OUTPATIENT
Start: 2024-01-11

## 2024-02-06 ENCOUNTER — OFFICE VISIT (OUTPATIENT)
Facility: LOCATION | Age: 54
End: 2024-02-06
Payer: COMMERCIAL

## 2024-02-06 DIAGNOSIS — J34.2 DEVIATED NASAL SEPTUM: Primary | ICD-10-CM

## 2024-02-06 PROCEDURE — 99024 POSTOP FOLLOW-UP VISIT: CPT | Performed by: OTOLARYNGOLOGY

## 2024-02-06 RX ORDER — PREDNISONE 10 MG/1
10 TABLET ORAL DAILY
Qty: 10 TABLET | Refills: 0 | Status: SHIPPED | OUTPATIENT
Start: 2024-02-06

## 2024-02-06 RX ORDER — CLARITHROMYCIN 500 MG/1
500 TABLET, COATED ORAL 2 TIMES DAILY
Qty: 20 TABLET | Refills: 0 | Status: SHIPPED | OUTPATIENT
Start: 2024-02-06

## 2024-02-07 NOTE — PROGRESS NOTES
Vik Frey is a 53 year old male. No chief complaint on file.    HPI:   He is postop septoplasty in December.  He is still having some nasal congestion.  He also feels like his nose is sore.    REVIEW OF SYSTEMS:   GENERAL HEALTH: feels well otherwise  GENERAL : denies fever, chills, sweats, weight loss, weight gain  SKIN: denies any unusual skin lesions or rashes  RESPIRATORY: denies shortness of breath with exertion  NEURO: denies headaches    EXAM:   There were no vitals taken for this visit.    System Findings Details   Constitutional  Overall appearance - Normal.   Psychiatric  Orientation - Oriented to time, place, person & situation. Appropriate mood and affect.   Head/Face  Facial features -- Normal. Skull - Normal.   Eyes  Pupils equal ,round ,react to light and accomidate   Ears, Nose, Throat, Neck  There is erythema and swelling bilaterally.  There is some purulence.  I am also concerned about a small septal perforation.   Neurological  Memory - Normal. Cranial nerves - Cranial nerves II through XII grossly intact.   Lymph Detail  Submental. Submandibular. Anterior cervical. Posterior cervical. Supraclavicular.       ASSESSMENT AND PLAN:   1. Deviated nasal septum  He is postop septoplasty in mid December.  He shows signs of infection.  He will use nasal saline.  He will stop Flonase.  He will add  Biaxin and prednisone and then see me back in a few weeks for recheck.    The patient indicates understanding of these issues and agrees to the plan.    No follow-ups on file.    Taqueria Galvan MD  2/6/2024  6:28 PM

## 2024-02-20 ENCOUNTER — OFFICE VISIT (OUTPATIENT)
Facility: LOCATION | Age: 54
End: 2024-02-20
Payer: COMMERCIAL

## 2024-02-20 DIAGNOSIS — M95.0 NASAL VALVE COLLAPSE: Primary | ICD-10-CM

## 2024-02-20 DIAGNOSIS — K14.0 GLOSSITIS: ICD-10-CM

## 2024-02-20 PROCEDURE — 99024 POSTOP FOLLOW-UP VISIT: CPT | Performed by: OTOLARYNGOLOGY

## 2024-02-20 RX ORDER — CLOTRIMAZOLE 10 MG/1
10 LOZENGE ORAL; TOPICAL 4 TIMES DAILY
Qty: 40 LOZENGE | Refills: 1 | Status: SHIPPED | OUTPATIENT
Start: 2024-02-20

## 2024-02-21 ENCOUNTER — TELEPHONE (OUTPATIENT)
Dept: FAMILY MEDICINE CLINIC | Facility: CLINIC | Age: 54
End: 2024-02-21

## 2024-02-21 DIAGNOSIS — M95.0 NASAL VALVE COLLAPSE: Primary | ICD-10-CM

## 2024-02-21 NOTE — PROGRESS NOTES
Vik Frey is a 53 year old male.   Chief Complaint   Patient presents with    Sinus Problem     HPI:   He is postop septoplasty reduction of the turbinates and Latera.  He continues to complain of nasal airway obstruction.  His tongue also burns.    REVIEW OF SYSTEMS:   GENERAL HEALTH: feels well otherwise  GENERAL : denies fever, chills, sweats, weight loss, weight gain  SKIN: denies any unusual skin lesions or rashes  RESPIRATORY: denies shortness of breath with exertion  NEURO: denies headaches    EXAM:   There were no vitals taken for this visit.    System Findings Details   Constitutional  Overall appearance - Normal.   Psychiatric  Orientation - Oriented to time, place, person & situation. Appropriate mood and affect.   Head/Face  Facial features -- Normal. Skull - Normal.   Eyes  Pupils equal ,round ,react to light and accomidate   Ears, Nose, Throat, Neck  Ears clear nose small septal perforation erythema and crusting is less but still some present there is nasal valve collapse which is still present oropharynx clear neck no masses   Neurological  Memory - Normal. Cranial nerves - Cranial nerves II through XII grossly intact.   Lymph Detail  Submental. Submandibular. Anterior cervical. Posterior cervical. Supraclavicular.       ASSESSMENT AND PLAN:   1. Nasal valve collapse  Status post septoplasty turbinate reduction and Latera on December 18.  He has a small septal perforation present.  The infection has improved in the nose but I still think he has some nasal vestibulitis.  He will use nasal saline and Bactroban ointment.  1 could consider repeat Latera procedure for the nasal valve collapse versus septorhinoplasty.  I will speak with him more after the above treatment.    2. Glossitis  He may have a component of thrush.  He has been on antibiotics and steroids.  I will treat him with Mycelex.      The patient indicates understanding of these issues and agrees to the plan.    No follow-ups on  file.    Taqueria Galvan MD  2/20/2024  6:27 PM

## 2024-02-21 NOTE — TELEPHONE ENCOUNTER
Marcin, Ori Mckenzie MD  [facility] Clinical Staff  Ok for referral to Dr Dixon (facial plastic surgery).          Previous Messages       ----- Message -----  From: Taqueria Galvan MD  Sent: 2/21/2024   8:56 AM CST  To: Ori Burch MD    Vik had a Latera procedure by myself along with septoplasty for nasal obstruction.  Unfortunately the Latera procedure did not work for the nasal valve collapse.  He will likely need a rhinoplasty approach to help with the nasal valve collapse.  Dr. Dixon is a facial plastic surgeon at [facility] who could possibly perform the surgery.  I would like to Vik consult with him.    Thanks so much,  Figueroa

## 2024-03-07 DIAGNOSIS — K21.9 GASTROESOPHAGEAL REFLUX DISEASE, UNSPECIFIED WHETHER ESOPHAGITIS PRESENT: ICD-10-CM

## 2024-03-07 RX ORDER — OMEPRAZOLE 40 MG/1
40 CAPSULE, DELAYED RELEASE ORAL DAILY
Qty: 90 CAPSULE | Refills: 0 | Status: SHIPPED | OUTPATIENT
Start: 2024-03-07

## 2024-03-11 ENCOUNTER — OFFICE VISIT (OUTPATIENT)
Dept: OTOLARYNGOLOGY | Facility: CLINIC | Age: 54
End: 2024-03-11
Payer: COMMERCIAL

## 2024-03-11 VITALS — HEIGHT: 68 IN | BODY MASS INDEX: 31.83 KG/M2 | WEIGHT: 210 LBS

## 2024-03-11 DIAGNOSIS — J34.2 DEVIATED NASAL SEPTUM: ICD-10-CM

## 2024-03-11 DIAGNOSIS — M95.0 NASAL VALVE COLLAPSE: Primary | ICD-10-CM

## 2024-03-11 DIAGNOSIS — J34.2 DEVIATED SEPTUM: ICD-10-CM

## 2024-03-11 DIAGNOSIS — J34.3 HYPERTROPHY OF NASAL TURBINATES: ICD-10-CM

## 2024-03-11 PROCEDURE — 99204 OFFICE O/P NEW MOD 45 MIN: CPT | Performed by: STUDENT IN AN ORGANIZED HEALTH CARE EDUCATION/TRAINING PROGRAM

## 2024-03-11 PROCEDURE — 31231 NASAL ENDOSCOPY DX: CPT | Performed by: STUDENT IN AN ORGANIZED HEALTH CARE EDUCATION/TRAINING PROGRAM

## 2024-03-11 PROCEDURE — 3008F BODY MASS INDEX DOCD: CPT | Performed by: STUDENT IN AN ORGANIZED HEALTH CARE EDUCATION/TRAINING PROGRAM

## 2024-03-11 NOTE — PROGRESS NOTES
[place]  OTOLARYNGOLOGY - HEAD & NECK SURGERY    3/11/2024     Reason for Consultation:   Nasal congestion, nasal valve collapse    History of Present Illness:   Patient is a pleasant 53 year old male who is being seen for chronic nasal congestion which first began after having trauma to his nose many years ago.  Recently he was seen by Dr. Galvan and had surgery in December for bilateral Latera implant placement, septoplasty, and bilateral inferior turbinate Coblation.  The patient states that he does not feel much better after the surgery.  He is also concerned about a septal perforation that was seen postoperatively on exam.  He does not have any significant nasal crusting or dryness.  He is here for second opinion and potential further surgery for his problem.  Patient states that when he lifts his cheek on both sides or when he lifts the tip of his nose he is able to breathe much much better.  He has been on multiple nasal sprays in the past including Flonase and azelastine.  He states that the nasal sprays did not help him breathe any better.    Past Medical History  Past Medical History:   Diagnosis Date    Anxiety state     Asthma (HCC)     Depression     Esophageal reflux     Sleep apnea     CPAP       Past Surgical History  Past Surgical History:   Procedure Laterality Date    REPAIR ING HERNIA,5+Y/O,REDUCIBL  2001       Family History  Family History   Problem Relation Age of Onset    Diabetes Mother     Diabetes Father     Heart Surgery Father         Artery repair        Social History  Pediatric History   Patient Parents    Not on file     Other Topics Concern    Not on file   Social History Narrative    Not on file           Current Medications:  Current Outpatient Medications   Medication Sig Dispense Refill    OMEPRAZOLE 40 MG Oral Capsule Delayed Release TAKE 1 CAPSULE(40 MG) BY MOUTH DAILY 90 capsule 0    clotrimazole 10 MG Mouth/Throat Nils Take 1 lozenge (10 mg total) by mouth 4 (four)  times daily. 40 lozenge 1    mupirocin 2 % External Ointment Apply 1 Application topically 3 (three) times daily. 1 each 3    predniSONE 10 MG Oral Tab Take 1 tablet (10 mg total) by mouth daily. 10 tablet 0    fluticasone propionate 50 MCG/ACT Nasal Suspension 2 sprays by Nasal route daily. 16 g 3    albuterol 108 (90 Base) MCG/ACT Inhalation Aero Soln Inhale 2 puffs into the lungs every 6 (six) hours as needed for Wheezing or Shortness of Breath. 1 each 2    clarithromycin 500 MG Oral Tab Take 1 tablet (500 mg total) by mouth 2 (two) times daily. (Patient not taking: Reported on 3/11/2024) 20 tablet 0    HYDROcodone-acetaminophen 5-325 MG Oral Tab Take 1-2 tablets by mouth every 4 (four) hours as needed for Pain. (Patient not taking: Reported on 3/11/2024) 15 tablet 0    clarithromycin 500 MG Oral Tab Take 1 tablet (500 mg total) by mouth 2 (two) times daily. (Patient not taking: Reported on 3/11/2024) 20 tablet 0    Sildenafil Citrate 50 MG Oral Tab Take 1 tablet (50 mg total) by mouth daily as needed for Erectile Dysfunction. (Patient not taking: Reported on 11/24/2023) 10 tablet 2    acetaminophen 325 MG Oral Tab Take 1 tablet (325 mg total) by mouth every 6 (six) hours as needed for Pain. (Patient not taking: Reported on 3/11/2024)         Allergies  Allergies   Allergen Reactions    Penicillin G HIVES       Review of Systems:   A comprehensive 10 point review of systems was completed.  Pertinent positives and negatives noted in the the HPI.    Physical Exam:   Height 5' 8\" (1.727 m), weight 210 lb (95.3 kg).    GENERAL: No acute distress, Comfortable appearing  FACE: HB 1/6, Normal Animation  HEAD: Normocephalic  EYES: EOMI, pupils equil  EARS: Bilateral Auricles Symmetric  NOSE: Nares patent bilaterally, there is tip ptosis, there is narrowing in the bilateral internal nasal valve area especially on the left, there is notable improvement in breathing using the modified Crowley maneuver bilaterally  ORAL  CAVITY: Tongue mobile, Oropharynx clear, Floor of mouth clear, Posterior oropharynx normal  NECK: No palpable lymphadenopathy, thyroid not palpable, nontender    PROCEDURE: BILATERAL RIGID NASAL ENDOSCOPY  Bilateral rigid nasal endoscopy (95496) was performed. Verbal consent was obtained from the patient to proceed with rigid nasal endoscopy. The nasal cavity was decongested and topically anesthetized with a combination of Oxymetazoline and 4% Lidocaine. A rigid 4mm 30 degree nasal endoscope connected to a high-definition endoscopy system was used to examine both nasal cavities. Digital photos and/or videos of relevant exam findings were obtained. The inferior meatus, inferior turbinate, nasopharynx, middle meatus, middle turbinate, superior meatus, superior turbinate, and sphenoethmoidal recess were examined bilaterally and deemed to be normal, with any exceptions as noted below. At the completion of the procedure the endoscope was removed. The patient tolerated the procedure well. There were no complications.    Findings: The bilateral inferior turbinates were enlarged. The Septum was deviated to the left caudally, there is a nasal septal perforation of the superior septum just anterior to the heads of the middle turbinates. The middle meatus was patent bilaterally, no pus drainage. There were no obvious masses or polyps noted.      Results:     Laboratory Data:  Lab Results   Component Value Date    WBC 15.3 (H) 01/05/2023    HGB 14.0 01/05/2023    HCT 42.8 01/05/2023    .0 01/05/2023    CREATSERUM 0.88 01/05/2023    BUN 22 (H) 01/05/2023     01/05/2023    K 3.7 01/05/2023     01/05/2023    CO2 27.0 01/05/2023     (H) 01/05/2023    CA 9.0 01/05/2023    ALB 3.5 01/05/2023    ALKPHO 84 01/05/2023    TP 7.1 01/05/2023    AST 16 01/05/2023    ALT 56 01/05/2023    TSH 0.862 12/16/2022    LIP 84 01/05/2023         Imaging:  No results found.      Impression:   Bilateral nasal valve  collapse  Deviated nasal septum  Bilateral inferior turbinate hypertrophy  Nasal congestion  Nasal obstruction    Recommendations:  I discussed with the patient open rhinoplasty approach, and repair of the nasal valves potentially using cartilage grafting.  However, due to the patient's previous septoplasty I am unsure how much cartilage is left for grafting.  I also offered an approach which does not need cartilage grafts.     I believe the patient is a good candidate for revision septoplasty, revision bilateral inferior turbinate submucosal resection, bilateral repair of nasal valve collapse, and placement columellar strut.  Discussed the risks and benefits of surgery with the patient and daughter in detail.  I will provide him a handout regarding risks of surgery and we will plan for surgery in April.    Thank you for allowing me to participate in the care of your patient.    Landry Dixon, DO   Otolaryngology/Rhinology, Sinus, and Endoscopic Skull Base Surgery  [facility] [address] [address]  Phone [phone]  Fax [phone]  3/11/2024  5:40 PM  3/11/2024

## 2024-03-11 NOTE — PATIENT INSTRUCTIONS
Nasal Surgery: Septoplasty  You’re scheduled to have nasal surgery. The type of nasal surgery you’re having is called septoplasty and may be done in conjunction with nasal turbinate reduction. Read on to learn more about what to expect during this surgery. During surgery, the surgeon may remove cartilage and bone to reshape the deviated septum. After surgery, there is more breathing space. Enough cartilage and bone remain to give the nose support.   What to expect during septoplasty  This surgery repairs a blockage inside the nose caused by a deviated septum. With a deviated septum, there's a problem with the wall that divides the nose into 2 chambers. A deviated septum may block air coming through 1 or both nostrils. This makes it harder for you to breathe through your nose. During septoplasty, the surgeon makes cuts (incisions) inside the nose. This is all done with an endoscope and surgical instruments. Then the surgeon trims, reshapes, moves, or removes cartilage and sometimes bone from the septum.     Risks  As with any surgery, nasal surgery has some risks. These include a risk of bleeding (less than 5% will need return to the operating room for cauterization), infection, persistent nasal crusting, and risks of anesthesia. There is a very small risk of brain fluid leak from your nose (CSF leak). Your breathing will likely be much better after surgery, however patients with severe allergies may still experience nasal congestion intermittently. Your breathing may not be perfectly equal on each side of your nose following surgery. The following are the possible risks.  Bleeding  Infection  Scar formation inside the nose  Tear duct injury (excessive tears)  Voice Change  Possible Cerebrospinal fluid leak  Nasal Deformity  Septal perforation or hematoma  Dry Nose or Excessive Crusting  Need for revision surgery  Risks of anesthesia (Your anesthesiologist will discuss these with you before the start of the  surgery)    After septoplasty  After septoplasty, you’ll be taken to a recovery area or to your hospital room. Your experience may be as follows:   You will have plastic splints inside of your nose with a suture holding it in place. This reduces bleeding and helps with healing. You may also have bandages (dressings) on the outside of your nose for the first 3-5 days after surgery  You will have an incision underneath the tip of your nose on the columella. These sutures will be removed on your first post op visit.  It’s normal to have some mucus and blood drain from your nose. Until packing is removed, you may have to breathe through your mouth.  Avoid blowing your nose for 2 weeks after surgery, and if you have to sneeze, do so with your mouth open  You may have some swelling or bruising around your eyes, although this is very rare  Expect some throat dryness and irritation.  You will likely have some numbness of the upper teeth and gums which is expected. This may take up to 3 months to return to normal.  Pain medicine will be prescribed as needed.  You will be prescribed Afrin nasal spray (Use 2 sprays in each nostril, twice daily for the first 3 days after surgery)  You will be prescribed nasal saline rinses (We recommend Neilmed Sinus Rinse bottle with saline packets; PLEASE USE DISTILLED WATER; You will start this on day 4 after surgery, and continue until you are told to stop by your surgeon)  You will also be prescribed antibiotics to take for 7-10 days after surgery        Follow-up care  You’ll need to follow up with your healthcare provider after your surgery. Here's what to expect:   Any splints or packing will be removed around 1 week after surgery. Your surgeon will also clean your nose out using instruments in the office. You may take 1 dose of the prescribed pain medicine prior to the appointment if you have someone to drive you to and from the office. Please avoid driving while on pain  medication.  After the splint or packing is removed, you’ll most likely breathe better than you did before surgery.  You may have minor numbness, pain, swelling, and a little stiffness under the tip of the nose.  In a few days, the inside of your nose may swell. Or a scab or crust may make it hard to breathe through your nose again. Leave the scab alone. Your provider will remove it during a follow up visit. Using saline (irrigation or aerosol) regularly after surgery helps to reduce the amount of crusting at each visit.  Patients are typically seen at weeks 1, 3, and 7 after surgery.  Contact your surgeon if you have any questions or concerns.

## 2024-03-12 ENCOUNTER — TELEPHONE (OUTPATIENT)
Dept: OTOLARYNGOLOGY | Facility: CLINIC | Age: 54
End: 2024-03-12

## 2024-03-12 NOTE — TELEPHONE ENCOUNTER
Left voice message for patient to call back regarding scheduling procedure.     Yoruba Interpretor #[id number]

## 2024-03-13 DIAGNOSIS — J34.89 NASAL OBSTRUCTION: ICD-10-CM

## 2024-03-13 DIAGNOSIS — R09.81 NASAL CONGESTION: ICD-10-CM

## 2024-03-13 DIAGNOSIS — J34.3 HYPERTROPHY OF NASAL TURBINATES: Primary | ICD-10-CM

## 2024-03-13 DIAGNOSIS — J34.2 DEVIATED NASAL SEPTUM: ICD-10-CM

## 2024-03-13 DIAGNOSIS — M95.0 NASAL VALVE COLLAPSE: ICD-10-CM

## 2024-03-13 NOTE — TELEPHONE ENCOUNTER
Patient returned call. Patient scheduled for Repair of nasal vestibular stenosis   Septoplasty   Bilateral inferior turbinate submucosal resection on 4/15/24 at [facility].

## 2024-03-13 NOTE — PROGRESS NOTES
Patient scheduled for Repair of nasal vestibular stenosis   Septoplasty   Bilateral inferior turbinate submucosal resection on 4/15/24 at [facility].

## 2024-04-05 PROBLEM — J35.8 ASYMMETRY OF TONSILS: Status: ACTIVE | Noted: 2024-04-05

## 2024-04-05 PROBLEM — R09.81 NASAL CONGESTION: Status: ACTIVE | Noted: 2024-04-05

## 2024-04-05 PROBLEM — R06.83 SNORING: Status: ACTIVE | Noted: 2024-04-05

## 2024-04-05 PROBLEM — R06.81 APNEA: Status: ACTIVE | Noted: 2024-04-05

## 2024-04-08 ENCOUNTER — TELEPHONE (OUTPATIENT)
Dept: OTOLARYNGOLOGY | Facility: CLINIC | Age: 54
End: 2024-04-08

## 2024-04-08 NOTE — TELEPHONE ENCOUNTER
Advised pt daughter to call surgical center regarding payment.    pt called asking questions prior to surgery, first asking if surgery will resolve his symptoms permanently , 2 questions asking if any possibility for septal perforation to be fixed, states when he blows nose it is a whistling noise ? 3 ) is his decreased sense of smell and taste related to his sinus issues? pt scheduled on 04/15, would you like them to be seen prior to surgery to discuss?

## 2024-04-08 NOTE — TELEPHONE ENCOUNTER
Hello, patient daughter called to request if someone from office can call back to confirm if down payment for surgery 04/16 needs to be made patient daughter claims patient got a call requesting one and also has other questions for nurse if you can please call back griselda.

## 2024-04-10 NOTE — TELEPHONE ENCOUNTER
Daughter Griselda on JONATHAN, verified pt name and . Pt daughter informed of note below.   If he wants to delay the surgery I can see him one more time to discuss all this. But in short, the goal is to produce a permanent result, but I can not guarantee that especially if he has seasonal allergies. I can attempt to fix his septal perforation but there is a risk (about 20% chance) that the perforation may remain or even become larger. The surgery will not address his sense of smell or taste which is most likely due to previous viruses.     Per Daughter, will relay message to pt, and they will call back if any issues.

## 2024-04-11 ENCOUNTER — TELEPHONE (OUTPATIENT)
Dept: OTOLARYNGOLOGY | Facility: CLINIC | Age: 54
End: 2024-04-11

## 2024-04-12 ENCOUNTER — TELEPHONE (OUTPATIENT)
Dept: OTOLARYNGOLOGY | Facility: CLINIC | Age: 54
End: 2024-04-12

## 2024-04-12 ENCOUNTER — TELEPHONE (OUTPATIENT)
Dept: FAMILY MEDICINE CLINIC | Facility: CLINIC | Age: 54
End: 2024-04-12

## 2024-04-12 ENCOUNTER — EKG ENCOUNTER (OUTPATIENT)
Dept: LAB | Age: 54
End: 2024-04-12
Attending: FAMILY MEDICINE
Payer: COMMERCIAL

## 2024-04-12 DIAGNOSIS — Z01.818 PRE-OP TESTING: ICD-10-CM

## 2024-04-12 DIAGNOSIS — Z82.49 FAMILY HISTORY OF HEART DISEASE: Primary | ICD-10-CM

## 2024-04-12 DIAGNOSIS — Z82.49 FAMILY HISTORY OF HEART DISEASE: ICD-10-CM

## 2024-04-12 PROCEDURE — 93010 ELECTROCARDIOGRAM REPORT: CPT | Performed by: INTERNAL MEDICINE

## 2024-04-12 PROCEDURE — 93005 ELECTROCARDIOGRAM TRACING: CPT

## 2024-04-12 NOTE — TELEPHONE ENCOUNTER
Notified patient's daughter of the order for the EKG. Patient's daughter verbalized understanding.

## 2024-04-12 NOTE — TELEPHONE ENCOUNTER
Pt's daughter called.  Pt is scheduled for surgery Monday 4-15-24.  Pt's family has history of heart issues.  Should pt have EKG prior to surgery.  Please call

## 2024-04-12 NOTE — TELEPHONE ENCOUNTER
Patient has not been seen since 8/2/2023.   Scheduled for surgery on 4/15/2024.  Family requesting an order for an EKG due to father recently passing from two clogged arteries.

## 2024-04-12 NOTE — TELEPHONE ENCOUNTER
Daughter advised to ask PCP for order of EKG   Daughter is stating patient Father was recently had surgery with heat complications.

## 2024-04-12 NOTE — TELEPHONE ENCOUNTER
Patient's daughter is requesting an  order for an EKG. Vik's father was recently passed away from two clogged arteries. This is a concern for Vik and his family.

## 2024-04-13 LAB
ATRIAL RATE: 83 BPM
P AXIS: 60 DEGREES
P-R INTERVAL: 156 MS
Q-T INTERVAL: 360 MS
QRS DURATION: 74 MS
QTC CALCULATION (BEZET): 423 MS
R AXIS: 44 DEGREES
T AXIS: 70 DEGREES
VENTRICULAR RATE: 83 BPM

## 2024-04-15 ENCOUNTER — TELEPHONE (OUTPATIENT)
Dept: OTOLARYNGOLOGY | Facility: CLINIC | Age: 54
End: 2024-04-15

## 2024-04-15 DIAGNOSIS — J34.2 DEVIATED NASAL SEPTUM: ICD-10-CM

## 2024-04-15 DIAGNOSIS — M95.0 NASAL VALVE COLLAPSE: Primary | ICD-10-CM

## 2024-04-15 DIAGNOSIS — J34.3 HYPERTROPHY OF NASAL TURBINATES: ICD-10-CM

## 2024-04-15 NOTE — TELEPHONE ENCOUNTER
Patient daughter calling to inform patient is having flu like symptoms and needs to reschedule procedure for next availability once patient is feeling better. Please advise.       Please call patient instead of daughter as she will be in school and unable to answer.   Please advise.

## 2024-04-15 NOTE — TELEPHONE ENCOUNTER
Spoke with patient. Patient is sick and would like to reschedule. Rescheduled patient for 6/10/24.     Turkish Interpretor #[id number]

## 2024-05-28 ENCOUNTER — OFFICE VISIT (OUTPATIENT)
Facility: LOCATION | Age: 54
End: 2024-05-28

## 2024-05-28 VITALS — BODY MASS INDEX: 31.07 KG/M2 | HEIGHT: 68 IN | WEIGHT: 205 LBS

## 2024-05-28 DIAGNOSIS — J34.2 DEVIATED NASAL SEPTUM: ICD-10-CM

## 2024-05-28 DIAGNOSIS — R09.81 NASAL CONGESTION: Primary | ICD-10-CM

## 2024-05-28 DIAGNOSIS — J34.3 HYPERTROPHY OF NASAL TURBINATES: ICD-10-CM

## 2024-05-28 DIAGNOSIS — J34.2 DEVIATED SEPTUM: ICD-10-CM

## 2024-05-28 DIAGNOSIS — M95.0 NASAL VALVE COLLAPSE: ICD-10-CM

## 2024-05-28 PROCEDURE — 3008F BODY MASS INDEX DOCD: CPT | Performed by: STUDENT IN AN ORGANIZED HEALTH CARE EDUCATION/TRAINING PROGRAM

## 2024-05-28 PROCEDURE — 31231 NASAL ENDOSCOPY DX: CPT | Performed by: STUDENT IN AN ORGANIZED HEALTH CARE EDUCATION/TRAINING PROGRAM

## 2024-05-28 PROCEDURE — 99213 OFFICE O/P EST LOW 20 MIN: CPT | Performed by: STUDENT IN AN ORGANIZED HEALTH CARE EDUCATION/TRAINING PROGRAM

## 2024-05-28 NOTE — PROGRESS NOTES
[place]  OTOLARYNGOLOGY - HEAD & NECK SURGERY    5/28/2024     Reason for Consultation:   Nasal congestion, nasal valve collapse    History of Present Illness:   Patient is a pleasant 53 year old male who is being seen for chronic nasal congestion which first began after having trauma to his nose many years ago.  Recently he was seen by Dr. Galvan and had surgery in December for bilateral Latera implant placement, septoplasty, and bilateral inferior turbinate Coblation.  The patient states that he does not feel much better after the surgery.  He is also concerned about a septal perforation that was seen postoperatively on exam.  He does not have any significant nasal crusting or dryness.  He is here for second opinion and potential further surgery for his problem.  Patient states that when he lifts his cheek on both sides or when he lifts the tip of his nose he is able to breathe much much better.  He has been on multiple nasal sprays in the past including Flonase and azelastine.  He states that the nasal sprays did not help him breathe any better.    INTERVAL HISTORY  5/28/2024: Patient had cold the day of surgery and had to cancel. States his breathing is still poor through his nose. States he feels better when he holds the tip of his nose up.    Past Medical History  Past Medical History:    Anxiety state    Asthma (HCC)    Depression    Esophageal reflux    Sleep apnea    CPAP       Past Surgical History  Past Surgical History:   Procedure Laterality Date    Repair ing hernia,4+y/o,reducibl  2001       Family History  Family History   Problem Relation Age of Onset    Diabetes Mother     Diabetes Father     Heart Surgery Father         Artery repair        Social History  Pediatric History   Patient Parents    Not on file     Other Topics Concern    Not on file   Social History Narrative    Not on file           Current Medications:  Current Outpatient Medications   Medication Sig Dispense Refill    OMEPRAZOLE  40 MG Oral Capsule Delayed Release TAKE 1 CAPSULE(40 MG) BY MOUTH DAILY 90 capsule 0    clotrimazole 10 MG Mouth/Throat Nils Take 1 lozenge (10 mg total) by mouth 4 (four) times daily. (Patient not taking: Reported on 5/28/2024) 40 lozenge 1    mupirocin 2 % External Ointment Apply 1 Application topically 3 (three) times daily. (Patient not taking: Reported on 5/28/2024) 1 each 3    clarithromycin 500 MG Oral Tab Take 1 tablet (500 mg total) by mouth 2 (two) times daily. (Patient not taking: Reported on 3/11/2024) 20 tablet 0    predniSONE 10 MG Oral Tab Take 1 tablet (10 mg total) by mouth daily. (Patient not taking: Reported on 5/28/2024) 10 tablet 0    fluticasone propionate 50 MCG/ACT Nasal Suspension 2 sprays by Nasal route daily. (Patient not taking: Reported on 5/28/2024) 16 g 3    HYDROcodone-acetaminophen 5-325 MG Oral Tab Take 1-2 tablets by mouth every 4 (four) hours as needed for Pain. (Patient not taking: Reported on 3/11/2024) 15 tablet 0    clarithromycin 500 MG Oral Tab Take 1 tablet (500 mg total) by mouth 2 (two) times daily. (Patient not taking: Reported on 3/11/2024) 20 tablet 0    Sildenafil Citrate 50 MG Oral Tab Take 1 tablet (50 mg total) by mouth daily as needed for Erectile Dysfunction. (Patient not taking: Reported on 11/24/2023) 10 tablet 2    albuterol 108 (90 Base) MCG/ACT Inhalation Aero Soln Inhale 2 puffs into the lungs every 6 (six) hours as needed for Wheezing or Shortness of Breath. (Patient not taking: Reported on 5/28/2024) 1 each 2    acetaminophen 325 MG Oral Tab Take 1 tablet (325 mg total) by mouth every 6 (six) hours as needed for Pain. (Patient not taking: Reported on 3/11/2024)         Allergies  Allergies   Allergen Reactions    Penicillin G HIVES       Review of Systems:   A comprehensive 10 point review of systems was completed.  Pertinent positives and negatives noted in the the HPI.    Physical Exam:   Height 5' 8\" (1.727 m), weight 205 lb (93 kg).    GENERAL: No  acute distress, Comfortable appearing  FACE: HB 1/6, Normal Animation  HEAD: Normocephalic  EYES: EOMI, pupils equil  EARS: Bilateral Auricles Symmetric  NOSE: Nares patent bilaterally, there is tip ptosis, there is narrowing in the bilateral internal nasal valve area especially on the left, there is notable improvement in breathing using the modified Jhonny maneuver bilaterally  ORAL CAVITY: Tongue mobile, Oropharynx clear, Floor of mouth clear, Posterior oropharynx normal  NECK: No palpable lymphadenopathy, thyroid not palpable, nontender    PROCEDURE: BILATERAL RIGID NASAL ENDOSCOPY  Bilateral rigid nasal endoscopy (85296) was performed. Verbal consent was obtained from the patient to proceed with rigid nasal endoscopy. The nasal cavity was decongested and topically anesthetized with a combination of Oxymetazoline and 4% Lidocaine. A rigid 4mm 30 degree nasal endoscope connected to a high-definition endoscopy system was used to examine both nasal cavities. Digital photos and/or videos of relevant exam findings were obtained. The inferior meatus, inferior turbinate, nasopharynx, middle meatus, middle turbinate, superior meatus, superior turbinate, and sphenoethmoidal recess were examined bilaterally and deemed to be normal, with any exceptions as noted below. At the completion of the procedure the endoscope was removed. The patient tolerated the procedure well. There were no complications.    Findings: The bilateral inferior turbinates were enlarged. The Septum was deviated to the left caudally, there is a nasal septal perforation of the superior septum just anterior to the heads of the middle turbinates. The middle meatus was patent bilaterally, no pus drainage. There were no obvious masses or polyps noted.      Results:     Laboratory Data:  Lab Results   Component Value Date    WBC 15.3 (H) 01/05/2023    HGB 14.0 01/05/2023    HCT 42.8 01/05/2023    .0 01/05/2023    CREATSERUM 0.88 01/05/2023    BUN 22  (H) 01/05/2023     01/05/2023    K 3.7 01/05/2023     01/05/2023    CO2 27.0 01/05/2023     (H) 01/05/2023    CA 9.0 01/05/2023    ALB 3.5 01/05/2023    ALKPHO 84 01/05/2023    TP 7.1 01/05/2023    AST 16 01/05/2023    ALT 56 01/05/2023    TSH 0.862 12/16/2022    LIP 84 01/05/2023         Imaging:  No results found.      Impression:   Bilateral nasal valve collapse  Deviated nasal septum  Bilateral inferior turbinate hypertrophy  Nasal congestion  Nasal obstruction    Recommendations:  I discussed with the patient open rhinoplasty approach, and repair of the nasal valves potentially using cartilage grafting.  However, due to the patient's previous septoplasty I am unsure how much cartilage is left for grafting.  I also offered an approach which does not need cartilage grafts.     I believe the patient is a good candidate for revision septoplasty, revision bilateral inferior turbinate submucosal resection, bilateral repair of nasal valve collapse, and placement columellar strut.  Discussed the risks and benefits of surgery with the patient and daughter in detail.    I would like to obtain a second opinion by Dr. Walker as the patient may have more of an improvement with rib graft and reconstruction with columellar strut or septal extension graft, along with spreaders. He would also like his septal perforation fixed as well as this is causing whistling.     Thank you for allowing me to participate in the care of your patient.    Landry Dixon, DO   Otolaryngology/Rhinology, Sinus, and Endoscopic Skull Base Surgery  [facility]   [address] [place] [address] [address] [place] [address]  Phone [phone]  Fax [phone]  3/11/2024  5:40 PM  5/28/2024

## 2024-05-30 ENCOUNTER — TELEPHONE (OUTPATIENT)
Dept: SURGERY | Facility: CLINIC | Age: 54
End: 2024-05-30

## 2024-05-30 NOTE — TELEPHONE ENCOUNTER
Daughter of patient called the office to schedule a consult with Dr. Walker. Message was sent to clinical staff for scheduling advice.      Consult  ENT Zack Ndiaye referring  DX: Ptosis and bilateral nasal valve collapse.   Suggested case is rib cartilage graft to nose.    Daughter - [phone]

## 2024-05-30 NOTE — TELEPHONE ENCOUNTER
Per clinical staff, need to refer to Dr. Temo Mckee. Called daughter and provided contact information for Dr. Mckee.

## 2024-06-03 ENCOUNTER — TELEPHONE (OUTPATIENT)
Dept: OTOLARYNGOLOGY | Facility: CLINIC | Age: 54
End: 2024-06-03

## 2024-06-03 NOTE — TELEPHONE ENCOUNTER
Patient's daughter calling because patient has been unable to get a second opinion before his 6/10 surgery. The doctor that was referred said he did not perform this type of surgery and referred him to another doctor, but they were outside patient's insurance. Patient's daughter would like a call back for next steps and to know if surgery should be postponed. Please call.

## 2024-06-05 DIAGNOSIS — J34.2 DEVIATED NASAL SEPTUM: ICD-10-CM

## 2024-06-05 DIAGNOSIS — J34.3 HYPERTROPHY OF NASAL TURBINATES: ICD-10-CM

## 2024-06-05 DIAGNOSIS — M95.0 NASAL VALVE COLLAPSE: ICD-10-CM

## 2024-06-05 DIAGNOSIS — R09.81 NASAL CONGESTION: Primary | ICD-10-CM

## 2024-06-05 NOTE — TELEPHONE ENCOUNTER
Spoke with daughter. Rescheduled surgery to 8/12/24 to provide patient enough time to get second opinion with plastic surgery. Family will reach out to insurance company to determine which provider is in network and then will follow up through [facility] for Dr. Dixon's opinion on a plastics second opinion.

## 2024-06-13 DIAGNOSIS — K21.9 GASTROESOPHAGEAL REFLUX DISEASE, UNSPECIFIED WHETHER ESOPHAGITIS PRESENT: ICD-10-CM

## 2024-06-17 RX ORDER — OMEPRAZOLE 40 MG/1
40 CAPSULE, DELAYED RELEASE ORAL DAILY
Qty: 90 CAPSULE | Refills: 0 | Status: SHIPPED | OUTPATIENT
Start: 2024-06-17

## 2024-06-28 ENCOUNTER — TELEPHONE (OUTPATIENT)
Dept: OTOLARYNGOLOGY | Facility: CLINIC | Age: 54
End: 2024-06-28

## 2024-07-01 NOTE — TELEPHONE ENCOUNTER
Spoke with daughter. Patient is considering using cadaveric rib cartilage for surgery and has additional questions for surgery. Family will schedule follow up visit with Dr. Dixon to discuss surgery in detail.

## 2024-07-02 ENCOUNTER — OFFICE VISIT (OUTPATIENT)
Facility: LOCATION | Age: 54
End: 2024-07-02

## 2024-07-02 DIAGNOSIS — J34.2 DEVIATED SEPTUM: ICD-10-CM

## 2024-07-02 DIAGNOSIS — J34.3 HYPERTROPHY OF NASAL TURBINATES: ICD-10-CM

## 2024-07-02 DIAGNOSIS — R09.81 NASAL CONGESTION: Primary | ICD-10-CM

## 2024-07-02 DIAGNOSIS — J34.2 DEVIATED NASAL SEPTUM: ICD-10-CM

## 2024-07-02 DIAGNOSIS — M95.0 NASAL VALVE COLLAPSE: ICD-10-CM

## 2024-07-02 PROCEDURE — 31231 NASAL ENDOSCOPY DX: CPT | Performed by: STUDENT IN AN ORGANIZED HEALTH CARE EDUCATION/TRAINING PROGRAM

## 2024-07-02 PROCEDURE — 99213 OFFICE O/P EST LOW 20 MIN: CPT | Performed by: STUDENT IN AN ORGANIZED HEALTH CARE EDUCATION/TRAINING PROGRAM

## 2024-07-02 NOTE — PROGRESS NOTES
[place]  OTOLARYNGOLOGY - HEAD & NECK SURGERY    7/2/2024     Reason for Consultation:   Nasal congestion, nasal valve collapse    History of Present Illness:   Patient is a pleasant 53 year old male who is being seen for chronic nasal congestion which first began after having trauma to his nose many years ago.  Recently he was seen by Dr. Galvan and had surgery in December for bilateral Latera implant placement, septoplasty, and bilateral inferior turbinate Coblation.  The patient states that he does not feel much better after the surgery.  He is also concerned about a septal perforation that was seen postoperatively on exam.  He does not have any significant nasal crusting or dryness.  He is here for second opinion and potential further surgery for his problem.  Patient states that when he lifts his cheek on both sides or when he lifts the tip of his nose he is able to breathe much much better.  He has been on multiple nasal sprays in the past including Flonase and azelastine.  He states that the nasal sprays did not help him breathe any better.    INTERVAL HISTORY  5/28/2024: Patient had cold the day of surgery and had to cancel. States his breathing is still poor through his nose. States he feels better when he holds the tip of his nose up.  7/2/2024: Patient returns today after not being able to see Dr. Walker due to insurance purposes. He would like to proceed with open functional rhinoplasty with cadaveric irradiated rib cartilage.    Past Medical History  Past Medical History:    Anxiety state    Asthma (HCC)    Depression    Esophageal reflux    Sleep apnea    CPAP       Past Surgical History  Past Surgical History:   Procedure Laterality Date    Repair ing hernia,4+y/o,reducibl  2001       Family History  Family History   Problem Relation Age of Onset    Diabetes Mother     Diabetes Father     Heart Surgery Father         Artery repair        Social History  Pediatric History   Patient Parents    Not on  file     Other Topics Concern    Not on file   Social History Narrative    Not on file           Current Medications:  Current Outpatient Medications   Medication Sig Dispense Refill    OMEPRAZOLE 40 MG Oral Capsule Delayed Release TAKE 1 CAPSULE(40 MG) BY MOUTH DAILY 90 capsule 0    clotrimazole 10 MG Mouth/Throat Nils Take 1 lozenge (10 mg total) by mouth 4 (four) times daily. (Patient not taking: Reported on 5/28/2024) 40 lozenge 1    mupirocin 2 % External Ointment Apply 1 Application topically 3 (three) times daily. (Patient not taking: Reported on 5/28/2024) 1 each 3    clarithromycin 500 MG Oral Tab Take 1 tablet (500 mg total) by mouth 2 (two) times daily. (Patient not taking: Reported on 3/11/2024) 20 tablet 0    predniSONE 10 MG Oral Tab Take 1 tablet (10 mg total) by mouth daily. (Patient not taking: Reported on 5/28/2024) 10 tablet 0    fluticasone propionate 50 MCG/ACT Nasal Suspension 2 sprays by Nasal route daily. (Patient not taking: Reported on 5/28/2024) 16 g 3    HYDROcodone-acetaminophen 5-325 MG Oral Tab Take 1-2 tablets by mouth every 4 (four) hours as needed for Pain. (Patient not taking: Reported on 3/11/2024) 15 tablet 0    clarithromycin 500 MG Oral Tab Take 1 tablet (500 mg total) by mouth 2 (two) times daily. (Patient not taking: Reported on 3/11/2024) 20 tablet 0    Sildenafil Citrate 50 MG Oral Tab Take 1 tablet (50 mg total) by mouth daily as needed for Erectile Dysfunction. (Patient not taking: Reported on 11/24/2023) 10 tablet 2    albuterol 108 (90 Base) MCG/ACT Inhalation Aero Soln Inhale 2 puffs into the lungs every 6 (six) hours as needed for Wheezing or Shortness of Breath. (Patient not taking: Reported on 5/28/2024) 1 each 2    acetaminophen 325 MG Oral Tab Take 1 tablet (325 mg total) by mouth every 6 (six) hours as needed for Pain. (Patient not taking: Reported on 3/11/2024)         Allergies  Allergies   Allergen Reactions    Penicillin G HIVES       Review of Systems:   A  comprehensive 10 point review of systems was completed.  Pertinent positives and negatives noted in the the HPI.    Physical Exam:   There were no vitals taken for this visit.    GENERAL: No acute distress, Comfortable appearing  FACE: HB 1/6, Normal Animation  HEAD: Normocephalic  EYES: EOMI, pupils equil  EARS: Bilateral Auricles Symmetric  NOSE: Nares patent bilaterally, there is tip ptosis, there is narrowing in the bilateral internal nasal valve area especially on the left, there is notable improvement in breathing using the modified Jhonny maneuver bilaterally  ORAL CAVITY: Tongue mobile, Oropharynx clear, Floor of mouth clear, Posterior oropharynx normal  NECK: No palpable lymphadenopathy, thyroid not palpable, nontender    PROCEDURE: BILATERAL RIGID NASAL ENDOSCOPY - As performed on 7/2/2024  Bilateral rigid nasal endoscopy (18344) was performed. Verbal consent was obtained from the patient to proceed with rigid nasal endoscopy. The nasal cavity was decongested and topically anesthetized with a combination of Oxymetazoline and 4% Lidocaine. A rigid 4mm 30 degree nasal endoscope connected to a high-definition endoscopy system was used to examine both nasal cavities. Digital photos and/or videos of relevant exam findings were obtained. The inferior meatus, inferior turbinate, nasopharynx, middle meatus, middle turbinate, superior meatus, superior turbinate, and sphenoethmoidal recess were examined bilaterally and deemed to be normal, with any exceptions as noted below. At the completion of the procedure the endoscope was removed. The patient tolerated the procedure well. There were no complications.    Findings: The bilateral inferior turbinates were enlarged. The Septum was deviated to the left caudally, there is a nasal septal perforation of the superior septum just anterior to the heads of the middle turbinates. The middle meatus was patent bilaterally, no pus drainage. There were no obvious masses or  polyps noted.      Results:     Laboratory Data:  Lab Results   Component Value Date    WBC 15.3 (H) 01/05/2023    HGB 14.0 01/05/2023    HCT 42.8 01/05/2023    .0 01/05/2023    CREATSERUM 0.88 01/05/2023    BUN 22 (H) 01/05/2023     01/05/2023    K 3.7 01/05/2023     01/05/2023    CO2 27.0 01/05/2023     (H) 01/05/2023    CA 9.0 01/05/2023    ALB 3.5 01/05/2023    ALKPHO 84 01/05/2023    TP 7.1 01/05/2023    AST 16 01/05/2023    ALT 56 01/05/2023    TSH 0.862 12/16/2022    LIP 84 01/05/2023         Imaging:  No results found.      Impression:   Bilateral nasal valve collapse  Deviated nasal septum  Bilateral inferior turbinate hypertrophy  Nasal congestion  Nasal obstruction    Recommendations:  I discussed with the patient open rhinoplasty approach, and repair of the nasal valves potentially using cartilage grafting.  However, due to the patient's previous septoplasty I am unsure how much cartilage is left for grafting.  I also offered an approach which does not need cartilage grafts.     I believe the patient is a good candidate for revision septoplasty, revision bilateral inferior turbinate submucosal resection, bilateral repair of nasal valve collapse, and placement of columellar strut. We discussed risks and benefits of surgery with the patient and daughter in detail.    We again spoke today regarding the surgical plan.  We discussed possible graft rejection, persistence of septal perforation, and need for further surgery.     Thank you for allowing me to participate in the care of your patient.    Landry Dixon,    Otolaryngology/Rhinology, Sinus, and Endoscopic Skull Base Surgery  [address] [place] [address]  Phone [phone]  Fax [phone]  3/11/2024  5:40 PM  7/2/2024

## 2024-07-11 ENCOUNTER — TELEPHONE (OUTPATIENT)
Dept: OTOLARYNGOLOGY | Facility: CLINIC | Age: 54
End: 2024-07-11

## 2024-07-11 DIAGNOSIS — J34.3 HYPERTROPHY OF NASAL TURBINATES: ICD-10-CM

## 2024-07-11 DIAGNOSIS — J34.2 DEVIATED NASAL SEPTUM: ICD-10-CM

## 2024-07-11 DIAGNOSIS — K14.0 GLOSSITIS: ICD-10-CM

## 2024-07-11 DIAGNOSIS — R09.81 NASAL CONGESTION: Primary | ICD-10-CM

## 2024-07-11 DIAGNOSIS — G47.33 OBSTRUCTIVE SLEEP APNEA: ICD-10-CM

## 2024-07-11 DIAGNOSIS — R09.81 NASAL CONGESTION: ICD-10-CM

## 2024-07-11 DIAGNOSIS — M95.0 NASAL VALVE COLLAPSE: ICD-10-CM

## 2024-07-11 DIAGNOSIS — J34.2 DEVIATED SEPTUM: ICD-10-CM

## 2024-07-11 DIAGNOSIS — M95.0 NASAL VALVE COLLAPSE: Primary | ICD-10-CM

## 2024-07-11 NOTE — PROGRESS NOTES
Patient scheduled for open functional rhinoplasty, endoscopic septal perforation repair, bilateral inferior turbinate submucosal resection on 8/16/24 at [facility].

## 2024-07-17 ENCOUNTER — OFFICE VISIT (OUTPATIENT)
Dept: FAMILY MEDICINE CLINIC | Facility: CLINIC | Age: 54
End: 2024-07-17
Payer: COMMERCIAL

## 2024-07-17 VITALS
HEIGHT: 68 IN | OXYGEN SATURATION: 98 % | HEART RATE: 80 BPM | SYSTOLIC BLOOD PRESSURE: 120 MMHG | RESPIRATION RATE: 16 BRPM | DIASTOLIC BLOOD PRESSURE: 84 MMHG | BODY MASS INDEX: 31.67 KG/M2 | WEIGHT: 209 LBS

## 2024-07-17 DIAGNOSIS — J45.20 MILD INTERMITTENT ASTHMA WITHOUT COMPLICATION (HCC): ICD-10-CM

## 2024-07-17 DIAGNOSIS — N52.9 ERECTILE DYSFUNCTION, UNSPECIFIED ERECTILE DYSFUNCTION TYPE: ICD-10-CM

## 2024-07-17 DIAGNOSIS — G47.33 OSA (OBSTRUCTIVE SLEEP APNEA): ICD-10-CM

## 2024-07-17 DIAGNOSIS — Z00.00 ROUTINE GENERAL MEDICAL EXAMINATION AT A HEALTH CARE FACILITY: Primary | ICD-10-CM

## 2024-07-17 DIAGNOSIS — Z12.5 SCREENING FOR PROSTATE CANCER: ICD-10-CM

## 2024-07-17 DIAGNOSIS — R21 RASH AND NONSPECIFIC SKIN ERUPTION: ICD-10-CM

## 2024-07-17 DIAGNOSIS — F52.4 PREMATURE EJACULATION: ICD-10-CM

## 2024-07-17 DIAGNOSIS — Z13.6 SCREENING FOR HEART DISEASE: ICD-10-CM

## 2024-07-17 DIAGNOSIS — Z12.11 COLON CANCER SCREENING: ICD-10-CM

## 2024-07-17 RX ORDER — SILDENAFIL 50 MG/1
50 TABLET, FILM COATED ORAL
Qty: 30 TABLET | Refills: 0 | Status: SHIPPED | OUTPATIENT
Start: 2024-07-17

## 2024-07-17 RX ORDER — ALBUTEROL SULFATE 90 UG/1
2 AEROSOL, METERED RESPIRATORY (INHALATION) EVERY 6 HOURS PRN
Qty: 1 EACH | Refills: 11 | Status: SHIPPED | OUTPATIENT
Start: 2024-07-17

## 2024-07-17 RX ORDER — CLOTRIMAZOLE AND BETAMETHASONE DIPROPIONATE 10; .64 MG/G; MG/G
1 CREAM TOPICAL 2 TIMES DAILY PRN
Qty: 60 G | Refills: 3 | Status: SHIPPED | OUTPATIENT
Start: 2024-07-17

## 2024-07-17 RX ORDER — FLUTICASONE PROPIONATE 50 MCG
2 SPRAY, SUSPENSION (ML) NASAL DAILY
Qty: 16 G | Refills: 11 | Status: SHIPPED | OUTPATIENT
Start: 2024-07-17

## 2024-07-17 NOTE — PATIENT INSTRUCTIONS
[facility] Address   [address], [address]  [place], [place]        Health Screening Guidelines, Men Ages 50 to 64   Screening tests and health counseling are a key part of managing your health. A screening test is done to find disorders or diseases in people who don't have any symptoms. Screening tests are not used to diagnose. They are used to find out if more testing is needed. The goal may be to find a disease early so it can be treated with more success. Or the goal may be to find a disease early so you can make lifestyle changes. You may need regular checkups to help you reduce your risk of disease.   Below are guidelines for men ages 50 to 64. Talk with your healthcare provider. Make sure you’re up-to-date on what you need.   We understand gender is a spectrum. We may use gendered terms to talk about anatomy and health risk. Please use this information in a way that works best for you and your provider as you talk about your care.   Screening  Who needs it How often    Unhealthy alcohol use  All men in this age group  At routine exams   Blood pressure All men in this age group  Once a year if your blood pressure is normal. Normal blood pressure is less than 120/80 mm Hg. If your blood pressure is higher than this, follow the advice of your healthcare provider.    Colorectal cancer All men at average risk in this age group  Talk with your healthcare provider about which test below is right for you:   Colonoscopy every 10 years  Flexible sigmoidoscopy every 5 years (or every 10 years with yearly fecal immunochemical test (FIT) stool test)  CT colonography (virtual colonoscopy) every 5 years  Yearly fecal occult blood test  Yearly FIT  Stool DNA test every 1 to 3 years  If you have a test that is not a colonoscopy and have an abnormal test result, you will need a colonoscopy.   You may need to be screened more or less often. This is based on personal or family health history. Talk with your healthcare  provider.    Depression All men in this age group  At routine exams   Type 2 diabetes or prediabetes  All in this age group  At least every 3 years (yearly if your blood sugar has already begun to rise)    Type 2 diabetes All men with prediabetes  Every year   Hepatitis C Men at higher risk for infection. Test 1 time for men born between 1945 and 1965.  Talk with your healthcare provider.    High cholesterol or triglycerides  All men in this age group  At least every 4 to 6 years. Talk with your healthcare provider about your risk. Ask if you should be tested more often.    HIV All men in this age group  At least 1 time. Talk with your healthcare provider about your risk factors. Ask if you should be tested more often.    Lung cancer All men in this age group who are in fairly good health and are at higher risk for lung cancer, and who:   Smoke or quit in the past 15 years  Have a 20-pack per year smoking history (1 pack a day for 20 years or 2 packs a day for 10 years)  Expert groups vary in their advice. Talk with your healthcare provider.  Yearly lung cancer screening with a low-dose CT scan (LDCT). Talk with your healthcare provider.    Obesity All men in this age group  At yearly routine exams    BMI (body mass index) All men in this age group Every year, to help find out if you are at a healthy weight for your height    Prostate cancer All men in this age group, talk with your healthcare provider about risks and benefits of a digital rectal exam (RAQUEL) and prostate-specific antigen (PSA) screening  At routine exams if you decide to be tested.    Syphilis Men at higher risk for infection  At routine exams. Talk with your healthcare provider.    Tuberculosis Men at higher risk for infection  Talk with your healthcare provider    Vision All men in this age group  Talk with your healthcare provider   Health counseling  Who needs it  How often    Diet and exercise Men who are overweight or obese  When diagnosed, and  then at routine exams    Sexually transmitted infection (STI) prevention  Men at higher risk for infection  At routine exams; talk with your healthcare provider    Use of tobacco and the health effects it can cause  All men in this age group  Every exam   [employer] last reviewed this educational content on 5/1/2021 © 2000-2023 [employer]. All rights reserved. This information is not intended as a substitute for professional medical care. Always follow your healthcare professional's instructions.

## 2024-07-18 NOTE — PROGRESS NOTES
[facility] Family Medicine Office Note  Chief Complaint:   Chief Complaint   Patient presents with    Well Adult       HPI:   This is a 53 year old male coming in for annual physical. Denies any acute concerns. Has upcoming nasal surgery for his chronic congestion, nasal valve collapse, septal deviation w/ Dr Dixon.       ED/PE - has not tried sildenafil yet because of cost/insurance issues.     Needs refill for clotrimazole/betamethasone. Has been prescribed by former pcp d/t rash. It does help resolve it. Notices it on arms, erythematous/scaly when it appears. No active lesions.     Past Medical History:    Anxiety state    Asthma (HCC)    Depression    Esophageal reflux    Sleep apnea    CPAP     Past Surgical History:   Procedure Laterality Date    Repair ing hernia,4+y/o,reducibl  2001     Social History:  Social History     Socioeconomic History    Marital status:    Tobacco Use    Smoking status: Former     Types: Cigarettes    Smokeless tobacco: Never   Vaping Use    Vaping status: Never Used   Substance and Sexual Activity    Alcohol use: Yes     Comment: rare    Drug use: Never     Family History:  Family History   Problem Relation Age of Onset    Diabetes Mother     Diabetes Father     Heart Surgery Father         Artery repair      Allergies:  Allergies   Allergen Reactions    Penicillin G HIVES     Current Meds:  Current Outpatient Medications   Medication Sig Dispense Refill    albuterol 108 (90 Base) MCG/ACT Inhalation Aero Soln Inhale 2 puffs into the lungs every 6 (six) hours as needed for Wheezing or Shortness of Breath. 1 each 11    fluticasone propionate 50 MCG/ACT Nasal Suspension 2 sprays by Nasal route daily. 16 g 11    clotrimazole-betamethasone 1-0.05 % External Cream Apply 1 Application topically 2 (two) times daily as needed. 60 g 3    Sildenafil Citrate 50 MG Oral Tab Take 1 tablet (50 mg total) by mouth daily as needed for Erectile Dysfunction. 30 tablet 0     OMEPRAZOLE 40 MG Oral Capsule Delayed Release TAKE 1 CAPSULE(40 MG) BY MOUTH DAILY 90 capsule 0    acetaminophen 325 MG Oral Tab Take 1 tablet (325 mg total) by mouth every 6 (six) hours as needed for Pain.        Counseling given: Not Answered       REVIEW OF SYSTEMS:   Review of Systems   Constitutional: Negative.    HENT: Negative.     Eyes: Negative.    Respiratory: Negative.     Cardiovascular: Negative.    Gastrointestinal: Negative.    Endocrine: Negative.    Genitourinary:         Sexual dysfunction   Musculoskeletal: Negative.    Skin: Negative.    Neurological: Negative.    Psychiatric/Behavioral: Negative.          EXAM:   /84   Pulse 80   Resp 16   Ht 5' 8\" (1.727 m)   Wt 209 lb (94.8 kg)   SpO2 98%   BMI 31.78 kg/m²  Estimated body mass index is 31.78 kg/m² as calculated from the following:    Height as of this encounter: 5' 8\" (1.727 m).    Weight as of this encounter: 209 lb (94.8 kg).   Vital signs reviewed.Appears stated age, well groomed.  Physical Exam  Vitals and nursing note reviewed.   Constitutional:       Appearance: Normal appearance. He is well-developed.   HENT:      Head: Normocephalic and atraumatic.      Right Ear: Tympanic membrane, ear canal and external ear normal.      Left Ear: Tympanic membrane, ear canal and external ear normal.      Nose: Congestion present. No rhinorrhea.      Mouth/Throat:      Mouth: Mucous membranes are moist.      Pharynx: Oropharynx is clear. No oropharyngeal exudate or posterior oropharyngeal erythema.   Eyes:      Extraocular Movements: Extraocular movements intact.      Conjunctiva/sclera: Conjunctivae normal.      Pupils: Pupils are equal, round, and reactive to light.   Neck:      Thyroid: No thyromegaly.   Cardiovascular:      Rate and Rhythm: Normal rate and regular rhythm.      Pulses: Normal pulses.      Heart sounds: Normal heart sounds. No murmur heard.  Pulmonary:      Effort: Pulmonary effort is normal. No respiratory distress.       Breath sounds: Normal breath sounds. No stridor. No wheezing, rhonchi or rales.   Chest:      Chest wall: No tenderness.   Abdominal:      General: Bowel sounds are normal. There is no distension.      Palpations: Abdomen is soft. There is no mass.      Tenderness: There is no abdominal tenderness. There is no guarding or rebound.      Hernia: No hernia is present.   Musculoskeletal:         General: No tenderness. Normal range of motion.      Cervical back: Normal range of motion.   Lymphadenopathy:      Cervical: No cervical adenopathy.   Skin:     General: Skin is warm and dry.      Findings: No rash.   Neurological:      Mental Status: He is alert and oriented to person, place, and time.   Psychiatric:         Mood and Affect: Mood normal.         Behavior: Behavior normal.         Thought Content: Thought content normal.         Judgment: Judgment normal.          ASSESSMENT AND PLAN:   1. Routine general medical examination at a health care facility  -Immunizations: Declines any vax  -Metabolic: BMI 31. BP wnl. Due for annual labs   -Cancer screening: due for CRC screening.   -Communicable disease: low risk   -Mental health: no concerns   -Other preventative: follow with dentistry and optometry.   -Lifestyle: Follow a well balanced healthy diet with emphasis on fruits, vegetables, whole grains, lean meats. Limit processed and junk foods. Aim for at least 150 minutes of moderate intensity exercise weekly. Make sure you are staying adequately hydrated. Aim to get 7-9 hours of sleep nightly.     - CBC With Differential With Platelet; Future  - Comp Metabolic Panel (14); Future  - Lipid Panel; Future  - TSH W Reflex To Free T4; Future    2. Screening for prostate cancer  - PSA Total, Screen [Male over 50]; Future    3. Mild intermittent asthma without complication (HCC)  Stable, CPM.   - albuterol 108 (90 Base) MCG/ACT Inhalation Aero Soln; Inhale 2 puffs into the lungs every 6 (six) hours as needed for Wheezing  or Shortness of Breath.  Dispense: 1 each; Refill: 11  - fluticasone propionate 50 MCG/ACT Nasal Suspension; 2 sprays by Nasal route daily.  Dispense: 16 g; Refill: 11    4. CARY (obstructive sleep apnea)  On hold right now d/t nasal issues. Will retry after surgery. Following with sleep clinic.     5. Colon cancer screening  - Surgery Referral - In Network    6. Premature ejaculation  Advised to trial sildenafil. Rx sent to [place] pharmacy. Can see urology. Reviewed medication administration, SE. F/u PRN  - Urology Referral - In Network    7. Rash and nonspecific skin eruption  - clotrimazole-betamethasone 1-0.05 % External Cream; Apply 1 Application topically 2 (two) times daily as needed.  Dispense: 60 g; Refill: 3    8. Screening for heart disease  - CARD TREADMILL STRESS, ADULT (CPT=93017); Future    9. Erectile dysfunction, unspecified erectile dysfunction type  See #6.   - Sildenafil Citrate 50 MG Oral Tab; Take 1 tablet (50 mg total) by mouth daily as needed for Erectile Dysfunction.  Dispense: 30 tablet; Refill: 0      Meds & Refills for this Visit:  Requested Prescriptions     Signed Prescriptions Disp Refills    albuterol 108 (90 Base) MCG/ACT Inhalation Aero Soln 1 each 11     Sig: Inhale 2 puffs into the lungs every 6 (six) hours as needed for Wheezing or Shortness of Breath.    fluticasone propionate 50 MCG/ACT Nasal Suspension 16 g 11     Si sprays by Nasal route daily.    clotrimazole-betamethasone 1-0.05 % External Cream 60 g 3     Sig: Apply 1 Application topically 2 (two) times daily as needed.    Sildenafil Citrate 50 MG Oral Tab 30 tablet 0     Sig: Take 1 tablet (50 mg total) by mouth daily as needed for Erectile Dysfunction.       Health Maintenance:  Health Maintenance Due   Topic Date Due    Colorectal Cancer Screening  Never done    Pneumococcal Vaccine: Birth to 64yrs (1 of 2 - PCV) Never done    COVID-19 Vaccine (4 - - season) 2023    Zoster Vaccines (2 of 2) 2023     PSA  01/21/2024    Annual Physical  08/02/2024       Patient/Caregiver Education: Patient/Caregiver Education: There are no barriers to learning. Medical education done.   Outcome: Patient verbalizes understanding. Patient is notified to call with any questions, complications, allergies, or worsening or changing symptoms.  Patient is to call with any side effects or complications from the treatments as a result of today.     Problem List:  Patient Active Problem List   Diagnosis    Pulmonary nodule    Mild intermittent asthma without complication (HCC)    Obstructive sleep apnea    Apnea    Asymmetry of tonsils    Nasal congestion    Snoring

## 2024-07-26 ENCOUNTER — LAB ENCOUNTER (OUTPATIENT)
Dept: LAB | Age: 54
End: 2024-07-26
Attending: FAMILY MEDICINE
Payer: COMMERCIAL

## 2024-07-26 DIAGNOSIS — Z00.00 ROUTINE GENERAL MEDICAL EXAMINATION AT A HEALTH CARE FACILITY: ICD-10-CM

## 2024-07-26 DIAGNOSIS — Z12.5 SCREENING FOR PROSTATE CANCER: ICD-10-CM

## 2024-07-26 LAB
ALBUMIN SERPL-MCNC: 4.4 G/DL (ref 3.2–4.8)
ALBUMIN/GLOB SERPL: 1.6 {RATIO} (ref 1–2)
ALP LIVER SERPL-CCNC: 59 U/L
ALT SERPL-CCNC: 16 U/L
ANION GAP SERPL CALC-SCNC: 5 MMOL/L (ref 0–18)
AST SERPL-CCNC: 25 U/L (ref ?–34)
BASOPHILS # BLD AUTO: 0.03 X10(3) UL (ref 0–0.2)
BASOPHILS NFR BLD AUTO: 0.4 %
BILIRUB SERPL-MCNC: 0.7 MG/DL (ref 0.3–1.2)
BUN BLD-MCNC: 18 MG/DL (ref 9–23)
CALCIUM BLD-MCNC: 9.2 MG/DL (ref 8.7–10.4)
CHLORIDE SERPL-SCNC: 106 MMOL/L (ref 98–112)
CHOLEST SERPL-MCNC: 173 MG/DL (ref ?–200)
CO2 SERPL-SCNC: 27 MMOL/L (ref 21–32)
COMPLEXED PSA SERPL-MCNC: 0.45 NG/ML (ref ?–4)
CREAT BLD-MCNC: 0.88 MG/DL
EGFRCR SERPLBLD CKD-EPI 2021: 102 ML/MIN/1.73M2 (ref 60–?)
EOSINOPHIL # BLD AUTO: 0.16 X10(3) UL (ref 0–0.7)
EOSINOPHIL NFR BLD AUTO: 2.2 %
ERYTHROCYTE [DISTWIDTH] IN BLOOD BY AUTOMATED COUNT: 13.3 %
FASTING PATIENT LIPID ANSWER: YES
FASTING STATUS PATIENT QL REPORTED: YES
GLOBULIN PLAS-MCNC: 2.8 G/DL (ref 2–3.5)
GLUCOSE BLD-MCNC: 98 MG/DL (ref 70–99)
HCT VFR BLD AUTO: 46.4 %
HDLC SERPL-MCNC: 48 MG/DL (ref 40–59)
HGB BLD-MCNC: 14.9 G/DL
IMM GRANULOCYTES # BLD AUTO: 0.02 X10(3) UL (ref 0–1)
IMM GRANULOCYTES NFR BLD: 0.3 %
LDLC SERPL CALC-MCNC: 111 MG/DL (ref ?–100)
LYMPHOCYTES # BLD AUTO: 2.74 X10(3) UL (ref 1–4)
LYMPHOCYTES NFR BLD AUTO: 37.1 %
MCH RBC QN AUTO: 26.8 PG (ref 26–34)
MCHC RBC AUTO-ENTMCNC: 32.1 G/DL (ref 31–37)
MCV RBC AUTO: 83.3 FL
MONOCYTES # BLD AUTO: 0.66 X10(3) UL (ref 0.1–1)
MONOCYTES NFR BLD AUTO: 8.9 %
NEUTROPHILS # BLD AUTO: 3.77 X10 (3) UL (ref 1.5–7.7)
NEUTROPHILS # BLD AUTO: 3.77 X10(3) UL (ref 1.5–7.7)
NEUTROPHILS NFR BLD AUTO: 51.1 %
NONHDLC SERPL-MCNC: 125 MG/DL (ref ?–130)
OSMOLALITY SERPL CALC.SUM OF ELEC: 288 MOSM/KG (ref 275–295)
PLATELET # BLD AUTO: 216 10(3)UL (ref 150–450)
POTASSIUM SERPL-SCNC: 4.4 MMOL/L (ref 3.5–5.1)
PROT SERPL-MCNC: 7.2 G/DL (ref 5.7–8.2)
RBC # BLD AUTO: 5.57 X10(6)UL
SODIUM SERPL-SCNC: 138 MMOL/L (ref 136–145)
TRIGL SERPL-MCNC: 71 MG/DL (ref 30–149)
TSI SER-ACNC: 1.82 MIU/ML (ref 0.55–4.78)
VLDLC SERPL CALC-MCNC: 12 MG/DL (ref 0–30)
WBC # BLD AUTO: 7.4 X10(3) UL (ref 4–11)

## 2024-07-26 PROCEDURE — 84443 ASSAY THYROID STIM HORMONE: CPT

## 2024-07-26 PROCEDURE — 80061 LIPID PANEL: CPT

## 2024-07-26 PROCEDURE — 85025 COMPLETE CBC W/AUTO DIFF WBC: CPT

## 2024-07-26 PROCEDURE — 80053 COMPREHEN METABOLIC PANEL: CPT

## 2024-07-26 PROCEDURE — 36415 COLL VENOUS BLD VENIPUNCTURE: CPT

## 2024-07-31 DIAGNOSIS — K21.9 GASTROESOPHAGEAL REFLUX DISEASE, UNSPECIFIED WHETHER ESOPHAGITIS PRESENT: ICD-10-CM

## 2024-07-31 RX ORDER — OMEPRAZOLE 40 MG/1
40 CAPSULE, DELAYED RELEASE ORAL DAILY
Qty: 90 CAPSULE | Refills: 0 | Status: SHIPPED | OUTPATIENT
Start: 2024-07-31

## 2024-08-01 ENCOUNTER — HOSPITAL ENCOUNTER (OUTPATIENT)
Dept: CV DIAGNOSTICS | Age: 54
Discharge: HOME | End: 2024-08-01
Attending: FAMILY MEDICINE
Payer: COMMERCIAL

## 2024-08-01 DIAGNOSIS — Z13.6 SCREENING FOR HEART DISEASE: ICD-10-CM

## 2024-08-01 PROCEDURE — 93017 CV STRESS TEST TRACING ONLY: CPT | Performed by: FAMILY MEDICINE

## 2024-08-01 PROCEDURE — 93018 CV STRESS TEST I&R ONLY: CPT | Performed by: FAMILY MEDICINE

## 2024-08-03 ENCOUNTER — HOSPITAL ENCOUNTER (OUTPATIENT)
Age: 54
Discharge: HOME | End: 2024-08-03
Payer: COMMERCIAL

## 2024-08-03 VITALS
RESPIRATION RATE: 16 BRPM | HEART RATE: 77 BPM | OXYGEN SATURATION: 94 % | BODY MASS INDEX: 31.52 KG/M2 | SYSTOLIC BLOOD PRESSURE: 120 MMHG | HEIGHT: 68 IN | TEMPERATURE: 98 F | DIASTOLIC BLOOD PRESSURE: 79 MMHG | WEIGHT: 208 LBS

## 2024-08-03 DIAGNOSIS — L03.116 CELLULITIS OF LEFT LOWER EXTREMITY: ICD-10-CM

## 2024-08-03 DIAGNOSIS — L25.5 CONTACT DERMATITIS DUE TO PLANTS, EXCEPT FOOD, UNSPECIFIED CONTACT DERMATITIS TYPE: Primary | ICD-10-CM

## 2024-08-03 DIAGNOSIS — R23.3 PETECHIAL RASH: ICD-10-CM

## 2024-08-03 LAB
#MXD IC: 0.6 X10ˆ3/UL (ref 0.1–1)
HCT VFR BLD AUTO: 45 %
HGB BLD-MCNC: 14.1 G/DL
LYMPHOCYTES # BLD AUTO: 2.5 X10ˆ3/UL (ref 1–4)
LYMPHOCYTES NFR BLD AUTO: 28.9 %
MCH RBC QN AUTO: 26.3 PG (ref 26–34)
MCHC RBC AUTO-ENTMCNC: 31.3 G/DL (ref 31–37)
MCV RBC AUTO: 83.8 FL (ref 80–100)
MIXED CELL %: 6.6 %
NEUTROPHILS # BLD AUTO: 5.7 X10ˆ3/UL (ref 1.5–7.7)
NEUTROPHILS NFR BLD AUTO: 64.5 %
PLATELET # BLD AUTO: 254 X10ˆ3/UL (ref 150–450)
RBC # BLD AUTO: 5.37 X10ˆ6/UL
WBC # BLD AUTO: 8.8 X10ˆ3/UL (ref 4–11)

## 2024-08-03 PROCEDURE — 85025 COMPLETE CBC W/AUTO DIFF WBC: CPT | Performed by: PHYSICIAN ASSISTANT

## 2024-08-03 PROCEDURE — 36415 COLL VENOUS BLD VENIPUNCTURE: CPT

## 2024-08-03 PROCEDURE — 99213 OFFICE O/P EST LOW 20 MIN: CPT

## 2024-08-03 RX ORDER — PREDNISONE 10 MG/1
TABLET ORAL
Qty: 30 TABLET | Refills: 0 | Status: SHIPPED | OUTPATIENT
Start: 2024-08-03

## 2024-08-03 RX ORDER — CLOBETASOL PROPIONATE 0.5 MG/G
1 CREAM TOPICAL 2 TIMES DAILY
Qty: 1 EACH | Refills: 0 | Status: SHIPPED | OUTPATIENT
Start: 2024-08-03

## 2024-08-03 RX ORDER — CEFADROXIL 500 MG/1
500 CAPSULE ORAL 2 TIMES DAILY
Qty: 14 CAPSULE | Refills: 0 | Status: SHIPPED | OUTPATIENT
Start: 2024-08-03 | End: 2024-08-10

## 2024-08-03 NOTE — ED PROVIDER NOTES
Patient Seen in: [facility]      History     Chief Complaint   Patient presents with    Rash Skin Problem     Stated Complaint: Rashes    Subjective:   HPI    53-year-old male with known history of GERD, sleep apnea and anxiety who comes in today complaining of bilateral lower extremity rash that started after going to his land and doing yard work.  Patient has multiple excoriated lesions along with blisters some secondary redness to the left posterior calf.  Patient's been using over-the-counter medications with minimal relief.  Spreading to his right wrist.  Objective:   Past Medical History:    Anxiety state    Asthma (HCC)    Depression    Esophageal reflux    Sleep apnea    CPAP              Past Surgical History:   Procedure Laterality Date    Repair ing hernia,4+y/o,reducibl  2001                Social History     Socioeconomic History    Marital status:    Tobacco Use    Smoking status: Former     Types: Cigarettes    Smokeless tobacco: Never   Vaping Use    Vaping status: Never Used   Substance and Sexual Activity    Alcohol use: Yes     Comment: rare    Drug use: Never              Review of Systems    Positive for stated Chief Complaint: Rash Skin Problem    Other systems are as noted in HPI.  Constitutional and vital signs reviewed.      All other systems reviewed and negative except as noted above.    Physical Exam     ED Triage Vitals [08/03/24 1413]   /79   Pulse 77   Resp 16   Temp 98.4 °F (36.9 °C)   Temp src Oral   SpO2 94 %   O2 Device None (Room air)       Current Vitals:   Vital Signs  BP: 120/79  Pulse: 77  Resp: 16  Temp: 98.4 °F (36.9 °C)  Temp src: Oral    Oxygen Therapy  SpO2: 94 %  O2 Device: None (Room air)            Physical Exam  Vitals and nursing note reviewed.   Constitutional:       General: He is not in acute distress.     Appearance: Normal appearance. He is well-developed. He is not diaphoretic.   HENT:      Head: Normocephalic and atraumatic.    Cardiovascular:      Rate and Rhythm: Normal rate and regular rhythm.   Pulmonary:      Effort: Pulmonary effort is normal. No respiratory distress.      Breath sounds: Normal breath sounds.   Musculoskeletal:      Cervical back: Normal range of motion.   Skin:     General: Skin is warm and dry.      Capillary Refill: Capillary refill takes less than 2 seconds.      Coloration: Skin is not pale.      Findings: Erythema (Posterior left calf surrounding blister) and rash present.      Comments: Patient with petechiae excoriated rash to bilateral lower extremities that is linear consistent with likely a contact dermatitis   Neurological:      Mental Status: He is alert and oriented to person, place, and time.      Motor: No abnormal muscle tone.      Coordination: Coordination normal.      Deep Tendon Reflexes: Reflexes are normal and symmetric.   Psychiatric:         Behavior: Behavior normal.         Thought Content: Thought content normal.         Judgment: Judgment normal.             ED Course     Labs Reviewed   POCT CBC            MDM             Medical Decision Making  53-year-old male who comes in today complaining of blistered erythematous rash to bilateral lower extremities worse on the left posterior calf than anywhere else.  Now having some rash to right forearm.  Patient was recently at his land and states he was doing outside yard work.    Problems Addressed:  Cellulitis of left lower extremity: acute illness or injury  Contact dermatitis due to plants, except food, unspecified contact dermatitis type: acute illness or injury  Petechial rash: acute illness or injury    Amount and/or Complexity of Data Reviewed  Labs: ordered. Decision-making details documented in ED Course.     Details: CBC normal WBC. Hgb, platelet     Risk  OTC drugs.  Prescription drug management.  Risk Details: Clinical Impression: contact dermatitis, petechia       The differential diagnosis before testing included contact  dermatitis, which is a medical condition that poses a threat to life/function.             Disposition and Plan     Clinical Impression:  1. Contact dermatitis due to plants, except food, unspecified contact dermatitis type    2. Petechial rash    3. Cellulitis of left lower extremity         Disposition:  Discharge  8/3/2024  2:48 pm    Follow-up:  Ori Burch MD  [id number]  [facility] [address]  [phone]    Schedule an appointment as soon as possible for a visit   If symptoms worsen          Medications Prescribed:  Current Discharge Medication List        START taking these medications    Details   PREDNISONE 10 MG Oral Tab Take 50mg x 2 days, take 40mg x 2 days, take 30mg x 2days, take 20 mg x 2 days, take 10mg x 2 days  Qty: 30 tablet, Refills: 0      clobetasol 0.05 % External Cream Apply 1 Application topically 2 (two) times daily.  Qty: 1 each, Refills: 0      cefadroxil 500 MG Oral Cap Take 1 capsule (500 mg total) by mouth 2 (two) times daily for 7 days.  Qty: 14 capsule, Refills: 0

## 2024-08-03 NOTE — DISCHARGE INSTRUCTIONS
Start prednisone taper, you can use topical steroid  Start oral antibiotic  While taking antibiotics it is recommended that you also take an over the counter probiotics.  If you'd like, you can have 2 servings of yogurt/Kefir daily instead.  Probiotics increase the good bacteria in your gut while the antibiotic fights the bad bacteria that is causing your infection.  The good bacteria in your gut act as part of your immune system.  Taking a probiotic while on antibiotics will decrease the chances of upset stomach and diarrhea, which are common side effects of antibiotics.

## 2024-08-13 ENCOUNTER — PATIENT MESSAGE (OUTPATIENT)
Dept: OTOLARYNGOLOGY | Facility: CLINIC | Age: 54
End: 2024-08-13

## 2024-08-13 NOTE — TELEPHONE ENCOUNTER
From: Vik Frey  To: Landry Dixon  Sent: 8/13/2024 2:34 PM CDT  Subject: Upcoming surgery    [facility],     Would it be possible to get the exact name of the surgery I am scheduled for this Friday August 16th for personal records?     Thank you.

## 2024-08-16 ENCOUNTER — HOSPITAL ENCOUNTER (OUTPATIENT)
Facility: HOSPITAL | Age: 54
Setting detail: HOSPITAL OUTPATIENT SURGERY
Discharge: HOME | End: 2024-08-16
Attending: STUDENT IN AN ORGANIZED HEALTH CARE EDUCATION/TRAINING PROGRAM | Admitting: STUDENT IN AN ORGANIZED HEALTH CARE EDUCATION/TRAINING PROGRAM
Payer: COMMERCIAL

## 2024-08-16 ENCOUNTER — ANESTHESIA (OUTPATIENT)
Dept: SURGERY | Facility: HOSPITAL | Age: 54
End: 2024-08-16
Payer: COMMERCIAL

## 2024-08-16 ENCOUNTER — ANESTHESIA EVENT (OUTPATIENT)
Dept: SURGERY | Facility: HOSPITAL | Age: 54
End: 2024-08-16
Payer: COMMERCIAL

## 2024-08-16 VITALS
BODY MASS INDEX: 31.37 KG/M2 | TEMPERATURE: 97 F | HEART RATE: 99 BPM | DIASTOLIC BLOOD PRESSURE: 89 MMHG | OXYGEN SATURATION: 94 % | WEIGHT: 207 LBS | SYSTOLIC BLOOD PRESSURE: 145 MMHG | RESPIRATION RATE: 12 BRPM | HEIGHT: 68 IN

## 2024-08-16 DIAGNOSIS — R09.81 NASAL CONGESTION: Primary | ICD-10-CM

## 2024-08-16 PROCEDURE — 30465 REPAIR NASAL STENOSIS: CPT | Performed by: STUDENT IN AN ORGANIZED HEALTH CARE EDUCATION/TRAINING PROGRAM

## 2024-08-16 PROCEDURE — 30630 REPAIR NASAL SEPTUM DEFECT: CPT | Performed by: STUDENT IN AN ORGANIZED HEALTH CARE EDUCATION/TRAINING PROGRAM

## 2024-08-16 PROCEDURE — 09BM8ZZ EXCISION OF NASAL SEPTUM, VIA NATURAL OR ARTIFICIAL OPENING ENDOSCOPIC: ICD-10-PCS | Performed by: STUDENT IN AN ORGANIZED HEALTH CARE EDUCATION/TRAINING PROGRAM

## 2024-08-16 PROCEDURE — 30520 REPAIR OF NASAL SEPTUM: CPT | Performed by: STUDENT IN AN ORGANIZED HEALTH CARE EDUCATION/TRAINING PROGRAM

## 2024-08-16 PROCEDURE — 30140 RESECT INFERIOR TURBINATE: CPT | Performed by: STUDENT IN AN ORGANIZED HEALTH CARE EDUCATION/TRAINING PROGRAM

## 2024-08-16 PROCEDURE — 09UK07Z SUPPLEMENT NASAL MUCOSA AND SOFT TISSUE WITH AUTOLOGOUS TISSUE SUBSTITUTE, OPEN APPROACH: ICD-10-PCS | Performed by: STUDENT IN AN ORGANIZED HEALTH CARE EDUCATION/TRAINING PROGRAM

## 2024-08-16 PROCEDURE — 09UM8KZ SUPPLEMENT NASAL SEPTUM WITH NONAUTOLOGOUS TISSUE SUBSTITUTE, VIA NATURAL OR ARTIFICIAL OPENING ENDOSCOPIC: ICD-10-PCS | Performed by: STUDENT IN AN ORGANIZED HEALTH CARE EDUCATION/TRAINING PROGRAM

## 2024-08-16 PROCEDURE — 09BL7ZZ EXCISION OF NASAL TURBINATE, VIA NATURAL OR ARTIFICIAL OPENING: ICD-10-PCS | Performed by: STUDENT IN AN ORGANIZED HEALTH CARE EDUCATION/TRAINING PROGRAM

## 2024-08-16 RX ORDER — FAMOTIDINE 20 MG/1
20 TABLET, FILM COATED ORAL ONCE
Status: COMPLETED | OUTPATIENT
Start: 2024-08-16 | End: 2024-08-16

## 2024-08-16 RX ORDER — SODIUM CHLORIDE, SODIUM LACTATE, POTASSIUM CHLORIDE, CALCIUM CHLORIDE 600; 310; 30; 20 MG/100ML; MG/100ML; MG/100ML; MG/100ML
INJECTION, SOLUTION INTRAVENOUS CONTINUOUS
Status: DISCONTINUED | OUTPATIENT
Start: 2024-08-16 | End: 2024-08-16

## 2024-08-16 RX ORDER — ROCURONIUM BROMIDE 10 MG/ML
INJECTION, SOLUTION INTRAVENOUS AS NEEDED
Status: DISCONTINUED | OUTPATIENT
Start: 2024-08-16 | End: 2024-08-16 | Stop reason: SURG

## 2024-08-16 RX ORDER — LIDOCAINE HYDROCHLORIDE AND EPINEPHRINE 10; 10 MG/ML; UG/ML
INJECTION, SOLUTION INFILTRATION; PERINEURAL AS NEEDED
Status: DISCONTINUED | OUTPATIENT
Start: 2024-08-16 | End: 2024-08-16 | Stop reason: HOSPADM

## 2024-08-16 RX ORDER — HYDROMORPHONE HYDROCHLORIDE 1 MG/ML
0.6 INJECTION, SOLUTION INTRAMUSCULAR; INTRAVENOUS; SUBCUTANEOUS EVERY 5 MIN PRN
Status: DISCONTINUED | OUTPATIENT
Start: 2024-08-16 | End: 2024-08-16

## 2024-08-16 RX ORDER — SULFAMETHOXAZOLE/TRIMETHOPRIM 800-160 MG
1 TABLET ORAL 2 TIMES DAILY
Qty: 14 TABLET | Refills: 0 | Status: SHIPPED | OUTPATIENT
Start: 2024-08-16 | End: 2024-08-23

## 2024-08-16 RX ORDER — ONDANSETRON 2 MG/ML
4 INJECTION INTRAMUSCULAR; INTRAVENOUS EVERY 6 HOURS PRN
Status: DISCONTINUED | OUTPATIENT
Start: 2024-08-16 | End: 2024-08-16

## 2024-08-16 RX ORDER — HYDROMORPHONE HYDROCHLORIDE 1 MG/ML
0.4 INJECTION, SOLUTION INTRAMUSCULAR; INTRAVENOUS; SUBCUTANEOUS EVERY 5 MIN PRN
Status: DISCONTINUED | OUTPATIENT
Start: 2024-08-16 | End: 2024-08-16

## 2024-08-16 RX ORDER — HYDROMORPHONE HYDROCHLORIDE 1 MG/ML
0.2 INJECTION, SOLUTION INTRAMUSCULAR; INTRAVENOUS; SUBCUTANEOUS EVERY 5 MIN PRN
Status: DISCONTINUED | OUTPATIENT
Start: 2024-08-16 | End: 2024-08-16

## 2024-08-16 RX ORDER — ACETAMINOPHEN AND CODEINE PHOSPHATE 300; 30 MG/1; MG/1
2 TABLET ORAL EVERY 6 HOURS PRN
Qty: 30 TABLET | Refills: 0 | Status: SHIPPED | OUTPATIENT
Start: 2024-08-16

## 2024-08-16 RX ORDER — ACETAMINOPHEN 500 MG
1000 TABLET ORAL ONCE
Status: COMPLETED | OUTPATIENT
Start: 2024-08-16 | End: 2024-08-16

## 2024-08-16 RX ORDER — OXYMETAZOLINE HYDROCHLORIDE 0.05 G/100ML
2 SPRAY NASAL 2 TIMES DAILY
Qty: 30 ML | Refills: 0 | Status: SHIPPED | OUTPATIENT
Start: 2024-08-16

## 2024-08-16 RX ORDER — PROCHLORPERAZINE EDISYLATE 5 MG/ML
5 INJECTION INTRAMUSCULAR; INTRAVENOUS EVERY 8 HOURS PRN
Status: DISCONTINUED | OUTPATIENT
Start: 2024-08-16 | End: 2024-08-16

## 2024-08-16 RX ORDER — FAMOTIDINE 10 MG/ML
20 INJECTION, SOLUTION INTRAVENOUS ONCE
Status: COMPLETED | OUTPATIENT
Start: 2024-08-16 | End: 2024-08-16

## 2024-08-16 RX ORDER — EPHEDRINE SULFATE 50 MG/ML
INJECTION, SOLUTION INTRAVENOUS AS NEEDED
Status: DISCONTINUED | OUTPATIENT
Start: 2024-08-16 | End: 2024-08-16 | Stop reason: SURG

## 2024-08-16 RX ORDER — LIDOCAINE HYDROCHLORIDE 10 MG/ML
INJECTION, SOLUTION EPIDURAL; INFILTRATION; INTRACAUDAL; PERINEURAL AS NEEDED
Status: DISCONTINUED | OUTPATIENT
Start: 2024-08-16 | End: 2024-08-16 | Stop reason: SURG

## 2024-08-16 RX ORDER — MIDAZOLAM HYDROCHLORIDE 1 MG/ML
INJECTION INTRAMUSCULAR; INTRAVENOUS AS NEEDED
Status: DISCONTINUED | OUTPATIENT
Start: 2024-08-16 | End: 2024-08-16 | Stop reason: SURG

## 2024-08-16 RX ORDER — NALOXONE HYDROCHLORIDE 0.4 MG/ML
80 INJECTION, SOLUTION INTRAMUSCULAR; INTRAVENOUS; SUBCUTANEOUS AS NEEDED
Status: DISCONTINUED | OUTPATIENT
Start: 2024-08-16 | End: 2024-08-16

## 2024-08-16 RX ORDER — CEFAZOLIN SODIUM 1 G/3ML
INJECTION, POWDER, FOR SOLUTION INTRAMUSCULAR; INTRAVENOUS AS NEEDED
Status: DISCONTINUED | OUTPATIENT
Start: 2024-08-16 | End: 2024-08-16 | Stop reason: SURG

## 2024-08-16 RX ORDER — METOCLOPRAMIDE HYDROCHLORIDE 5 MG/ML
10 INJECTION INTRAMUSCULAR; INTRAVENOUS ONCE
Status: COMPLETED | OUTPATIENT
Start: 2024-08-16 | End: 2024-08-16

## 2024-08-16 RX ORDER — MORPHINE SULFATE 4 MG/ML
2 INJECTION, SOLUTION INTRAMUSCULAR; INTRAVENOUS EVERY 10 MIN PRN
Status: DISCONTINUED | OUTPATIENT
Start: 2024-08-16 | End: 2024-08-16

## 2024-08-16 RX ORDER — DIPHENHYDRAMINE HYDROCHLORIDE 50 MG/ML
INJECTION INTRAMUSCULAR; INTRAVENOUS AS NEEDED
Status: DISCONTINUED | OUTPATIENT
Start: 2024-08-16 | End: 2024-08-16 | Stop reason: SURG

## 2024-08-16 RX ORDER — DEXAMETHASONE SODIUM PHOSPHATE 4 MG/ML
VIAL (ML) INJECTION AS NEEDED
Status: DISCONTINUED | OUTPATIENT
Start: 2024-08-16 | End: 2024-08-16 | Stop reason: SURG

## 2024-08-16 RX ORDER — MORPHINE SULFATE 4 MG/ML
4 INJECTION, SOLUTION INTRAMUSCULAR; INTRAVENOUS EVERY 10 MIN PRN
Status: DISCONTINUED | OUTPATIENT
Start: 2024-08-16 | End: 2024-08-16

## 2024-08-16 RX ORDER — ONDANSETRON 2 MG/ML
INJECTION INTRAMUSCULAR; INTRAVENOUS AS NEEDED
Status: DISCONTINUED | OUTPATIENT
Start: 2024-08-16 | End: 2024-08-16 | Stop reason: SURG

## 2024-08-16 RX ORDER — METOCLOPRAMIDE 10 MG/1
10 TABLET ORAL ONCE
Status: COMPLETED | OUTPATIENT
Start: 2024-08-16 | End: 2024-08-16

## 2024-08-16 RX ORDER — MORPHINE SULFATE 10 MG/ML
6 INJECTION, SOLUTION INTRAMUSCULAR; INTRAVENOUS EVERY 10 MIN PRN
Status: DISCONTINUED | OUTPATIENT
Start: 2024-08-16 | End: 2024-08-16

## 2024-08-16 RX ORDER — PHENYLEPHRINE HCL 10 MG/ML
VIAL (ML) INJECTION AS NEEDED
Status: DISCONTINUED | OUTPATIENT
Start: 2024-08-16 | End: 2024-08-16 | Stop reason: SURG

## 2024-08-16 RX ADMIN — SODIUM CHLORIDE, SODIUM LACTATE, POTASSIUM CHLORIDE, CALCIUM CHLORIDE: 600; 310; 30; 20 INJECTION, SOLUTION INTRAVENOUS at 11:13:00

## 2024-08-16 RX ADMIN — EPHEDRINE SULFATE 10 MG: 50 INJECTION, SOLUTION INTRAVENOUS at 11:33:00

## 2024-08-16 RX ADMIN — SODIUM CHLORIDE, SODIUM LACTATE, POTASSIUM CHLORIDE, CALCIUM CHLORIDE: 600; 310; 30; 20 INJECTION, SOLUTION INTRAVENOUS at 10:15:00

## 2024-08-16 RX ADMIN — CEFAZOLIN SODIUM 2 G: 1 INJECTION, POWDER, FOR SOLUTION INTRAMUSCULAR; INTRAVENOUS at 10:41:00

## 2024-08-16 RX ADMIN — PHENYLEPHRINE HCL 50 MCG: 10 MG/ML VIAL (ML) INJECTION at 11:04:00

## 2024-08-16 RX ADMIN — PHENYLEPHRINE HCL 50 MCG: 10 MG/ML VIAL (ML) INJECTION at 11:13:00

## 2024-08-16 RX ADMIN — DIPHENHYDRAMINE HYDROCHLORIDE 25 MG: 50 INJECTION INTRAMUSCULAR; INTRAVENOUS at 10:41:00

## 2024-08-16 RX ADMIN — PHENYLEPHRINE HCL 50 MCG: 10 MG/ML VIAL (ML) INJECTION at 10:33:00

## 2024-08-16 RX ADMIN — PHENYLEPHRINE HCL 50 MCG: 10 MG/ML VIAL (ML) INJECTION at 11:21:00

## 2024-08-16 RX ADMIN — DEXAMETHASONE SODIUM PHOSPHATE 8 MG: 4 MG/ML VIAL (ML) INJECTION at 10:29:00

## 2024-08-16 RX ADMIN — LIDOCAINE HYDROCHLORIDE 50 MG: 10 INJECTION, SOLUTION EPIDURAL; INFILTRATION; INTRACAUDAL; PERINEURAL at 10:20:00

## 2024-08-16 RX ADMIN — MIDAZOLAM HYDROCHLORIDE 2 MG: 1 INJECTION INTRAMUSCULAR; INTRAVENOUS at 10:13:00

## 2024-08-16 RX ADMIN — PHENYLEPHRINE HCL 50 MCG: 10 MG/ML VIAL (ML) INJECTION at 12:52:00

## 2024-08-16 RX ADMIN — ROCURONIUM BROMIDE 50 MG: 10 INJECTION, SOLUTION INTRAVENOUS at 10:21:00

## 2024-08-16 RX ADMIN — ONDANSETRON 4 MG: 2 INJECTION INTRAMUSCULAR; INTRAVENOUS at 12:27:00

## 2024-08-16 NOTE — ANESTHESIA PREPROCEDURE EVALUATION
Anesthesia PreOp Note    HPI:     Vik Frey is a 53 year old male who presents for preoperative consultation requested by: Landry Dixon DO    Date of Surgery: 2024    Procedure(s):  Open functional rhinoplasty, endoscopic septal perforation repair, bilateral inferior turbinate submucosal resection  NASAL SEPTOPLASTY TURBINECTOMY  Indication: Nasal valve collapse [M95.0]  Deviated nasal septum [J34.2]  Hypertrophy of nasal turbinates [J34.3]  Obstructive sleep apnea [G47.33]  Nasal congestion [R09.81]  Glossitis [K14.0]    Relevant Problems   No relevant active problems       NPO:  Last Liquid Consumption Date: 08/15/24  Last Liquid Consumption Time:   Last Solid Consumption Date: 08/15/24  Last Solid Consumption Time:   Last Liquid Consumption Date: 08/15/24          History Review:  Patient Active Problem List    Diagnosis Date Noted    Apnea 2024    Asymmetry of tonsils 2024    Nasal congestion 2024    Snoring 2024    Obstructive sleep apnea 2023    Pulmonary nodule 2022    Mild intermittent asthma without complication (HCC) 2022       Past Medical History:    Anxiety state    Asthma (HCC)    Depression    Esophageal reflux    Sleep apnea    CPAP       Past Surgical History:   Procedure Laterality Date    Other surgical history Bilateral     nasal septoplasty    Repair ing hernia,5+y/o,reducibl         Medications Prior to Admission   Medication Sig Dispense Refill Last Dose    PREDNISONE 10 MG Oral Tab Take 50mg x 2 days, take 40mg x 2 days, take 30mg x 2days, take 20 mg x 2 days, take 10mg x 2 days 30 tablet 0 2024    clobetasol 0.05 % External Cream Apply 1 Application topically 2 (two) times daily. 1 each 0 Past Month    [] cefadroxil 500 MG Oral Cap Take 1 capsule (500 mg total) by mouth 2 (two) times daily for 7 days. 14 capsule 0 2024    OMEPRAZOLE 40 MG Oral Capsule Delayed Release TAKE 1 CAPSULE(40 MG) BY MOUTH DAILY  (Patient taking differently: Take 1 capsule (40 mg total) by mouth daily. Pt takes every other day) 90 capsule 0 Past Month    albuterol 108 (90 Base) MCG/ACT Inhalation Aero Soln Inhale 2 puffs into the lungs every 6 (six) hours as needed for Wheezing or Shortness of Breath. 1 each 11 Past Week    Sildenafil Citrate 50 MG Oral Tab Take 1 tablet (50 mg total) by mouth daily as needed for Erectile Dysfunction. 30 tablet 0 Past Month    acetaminophen 325 MG Oral Tab Take 1 tablet (325 mg total) by mouth every 6 (six) hours as needed for Pain.   Past Week    fluticasone propionate 50 MCG/ACT Nasal Suspension 2 sprays by Nasal route daily. (Patient not taking: Reported on 8/3/2024) 16 g 11     clotrimazole-betamethasone 1-0.05 % External Cream Apply 1 Application topically 2 (two) times daily as needed. (Patient not taking: Reported on 8/3/2024) 60 g 3      Current Facility-Administered Medications Ordered in Epic   Medication Dose Route Frequency Provider Last Rate Last Admin    lactated ringers infusion   Intravenous Continuous Landry Dixon DO        acetaminophen (Tylenol Extra Strength) tab 1,000 mg  1,000 mg Oral Once Landry Dixon DO        famotidine (Pepcid) tab 20 mg  20 mg Oral Once Landry Dixon DO        Or    famotidine (Pepcid) 20 mg/2mL injection 20 mg  20 mg Intravenous Once Landry Dixon DO        metoclopramide (Reglan) tab 10 mg  10 mg Oral Once Landry Dixon DO        Or    metoclopramide (Reglan) 5 mg/mL injection 10 mg  10 mg Intravenous Once Landry Dixon DO         No current [facility]-ordered outpatient medications on file.       Allergies   Allergen Reactions    Penicillin G HIVES     Denies skin peeling/blister, organ damage/failure         Family History   Problem Relation Age of Onset    Diabetes Father     Heart Surgery Father         Artery repair     Diabetes Mother      Social History     Socioeconomic History    Marital status:    Tobacco Use    Smoking  status: Former     Types: Cigarettes    Smokeless tobacco: Never   Vaping Use    Vaping status: Never Used   Substance and Sexual Activity    Alcohol use: Not Currently     Comment: ocassionally    Drug use: Never       Available pre-op labs reviewed.  Lab Results   Component Value Date    WBC 7.4 07/26/2024    RBC 5.57 07/26/2024    HGB 14.9 07/26/2024    HCT 46.4 07/26/2024    MCV 83.8 08/03/2024    MCV 83.3 07/26/2024    MCH 26.8 07/26/2024    MCHC 31.3 08/03/2024    MCHC 32.1 07/26/2024    RDW 13.3 07/26/2024    .0 07/26/2024     Lab Results   Component Value Date     07/26/2024    K 4.4 07/26/2024     07/26/2024    CO2 27.0 07/26/2024    BUN 18 07/26/2024    CREATSERUM 0.88 07/26/2024    GLU 98 07/26/2024    CA 9.2 07/26/2024          Vital Signs:  Body mass index is 31.47 kg/m².   height is 1.727 m (5' 8\") and weight is 93.9 kg (207 lb). His oral temperature is 98.7 °F (37.1 °C). His blood pressure is 126/83 and his pulse is 77. His respiration is 18 and oxygen saturation is 96%.   Vitals:    08/07/24 0959 08/16/24 0858   BP:  126/83   Pulse:  77   Resp:  18   Temp:  98.7 °F (37.1 °C)   TempSrc:  Oral   SpO2:  96%   Weight: 94.3 kg (208 lb) 93.9 kg (207 lb)   Height: 1.727 m (5' 8\")         Anesthesia Evaluation     Patient summary reviewed and Nursing notes reviewed    Airway   Mallampati: I  Dental      Pulmonary - negative ROS and normal exam   Cardiovascular - normal exam  Exercise tolerance: good    NYHA Classification: I    Neuro/Psych - negative ROS     GI/Hepatic/Renal - negative ROS     Endo/Other - negative ROS   Abdominal                  Anesthesia Plan:   ASA:  2  Plan:   General  Airway:  ETT  Post-op Pain Management: IV analgesics      I have informed Vik Frey and/or legal guardian or family member of the nature of the anesthetic plan, benefits, risks including possible dental damage if relevant, major complications, and any alternative forms of anesthetic management.    All of the patient's questions were answered to the best of my ability. The patient desires the anesthetic management as planned.  VÍCTOR DUMAS MD  8/16/2024 9:13 AM  Present on Admission:  **None**

## 2024-08-16 NOTE — DISCHARGE INSTRUCTIONS
Nasal Surgery: Septoplasty  You’re scheduled to have nasal surgery. The type of nasal surgery you’re having is called septoplasty and may be done in conjunction with nasal turbinate reduction. Read on to learn more about what to expect during this surgery. During surgery, the surgeon may remove cartilage and bone to reshape the deviated septum. After surgery, there is more breathing space. Enough cartilage and bone remain to give the nose support.   What to expect during septoplasty  This surgery repairs a blockage inside the nose caused by a deviated septum. With a deviated septum, there's a problem with the wall that divides the nose into 2 chambers. A deviated septum may block air coming through 1 or both nostrils. This makes it harder for you to breathe through your nose. During septoplasty, the surgeon makes cuts (incisions) inside the nose. This is all done with an endoscope and surgical instruments. Then the surgeon trims, reshapes, moves, or removes cartilage and sometimes bone from the septum.     Risks  As with any surgery, nasal surgery has some risks. These include a risk of bleeding (less than 5% will need return to the operating room for cauterization), infection, persistent nasal crusting, and risks of anesthesia. There is a very small risk of brain fluid leak from your nose (CSF leak). Your breathing will likely be much better after surgery, however patients with severe allergies may still experience nasal congestion intermittently. Your breathing may not be perfectly equal on each side of your nose following surgery. The following are the possible risks.  Bleeding  Infection  Scar formation inside the nose  Tear duct injury (excessive tears)  Voice Change  Possible Cerebrospinal fluid leak  Nasal Deformity  Septal perforation or hematoma  Dry Nose or Excessive Crusting  Need for revision surgery  Risks of anesthesia (Your anesthesiologist will discuss these with you before the start of the  surgery)    After septoplasty  After septoplasty, you’ll be taken to a recovery area or to your hospital room. Your experience may be as follows:   You will have plastic splints inside of your nose with a suture holding it in place. This reduces bleeding and helps with healing. You may also have bandages (dressings) on the outside of your nose for the first 3-5 days after surgery  It’s normal to have some mucus and blood drain from your nose. Until packing is removed, you may have to breathe through your mouth.  Avoid blowing your nose for 2 weeks after surgery, and if you have to sneeze, do so with your mouth open  You may have some swelling or bruising around your eyes, although this is very rare  Expect some throat dryness and irritation.  You will likely have some numbness of the upper teeth and gums which is expected. This may take up to 3 months to return to normal.  Pain medicine will be prescribed as needed.  You will be prescribed Afrin nasal spray (Use 2 sprays in each nostril, twice daily for the first 3 days after surgery)  Please use nasal saline spray 3-4 sprays on each side 4 times a day.  You will also be prescribed antibiotics to take for 7-10 days after surgery        Follow-up care  You’ll need to follow up with your healthcare provider after your surgery. Here's what to expect:   Any splints or packing will be removed around 1 week after surgery. Your surgeon will also clean your nose out using instruments in the office. You may take 1 dose of the prescribed pain medicine prior to the appointment if you have someone to drive you to and from the office. Please avoid driving while on pain medication.  After the splint or packing is removed, you’ll most likely breathe better than you did before surgery.  You may have minor numbness, pain, swelling, and a little stiffness under the tip of the nose.  In a few days, the inside of your nose may swell. Or a scab or crust may make it hard to breathe  through your nose again. Leave the scab alone. Your provider will remove it during a follow up visit. Using saline (irrigation or aerosol) regularly after surgery helps to reduce the amount of crusting at each visit.  Patients are typically seen at weeks 1, 3, and 7 after surgery, and some patients may need 6 month follow ups after surgery, especially if allergies need to be managed.  Contact your surgeon if you have any questions or concerns.     HOME INSTRUCTIONS  [facility] HOME CARE INSTRUCTIONS: POST-OP ANESTHESIA  The medication that you received for sedation or general anesthesia can last up to 24 hours. Your judgment and reflexes may be altered, even if you feel like your normal self.      We Recommend:   Do not drive any motor vehicle or bicycle   Avoid mowing the lawn, playing sports, or working with power tools/applicances (power saws, electric knives or mixers)   That you have someone stay with you on your first night home   Do not drink alcohol or take sleeping pills or tranquilizers   Do not sign legal documents within 24 hours of your procedure   If you had a nerve block for your surgery, take extra care not to put any pressure on your arm or hand for 24 hours    It is normal:  For you to have a sore throat if you had a breathing tube during surgery (while you were asleep!). The sore throat should get better within 48 hours. You can gargle with warm salt water (1/2 tsp in 4 oz warm water) or use a throat lozenge for comfort  To feel muscle aches or soreness especially in the abdomen, chest or neck. The achy feeling should go away in the next 24 hours  To feel weak, sleepy or \"wiped out\". Your should start feeling better in the next 24 hours.   To experience mild discomforts such as sore lip or tongue, headache, cramps, gas pains or a bloated feeling in your abdomen.   To experience mild back pain or soreness for a day or two if you had spinal or epidural anesthesia.   If you had laparoscopic surgery, to  feel shoulder pain or discomfort on the day of surgery.   For some patients to have nausea after surgery/anesthesia    If you feel nausea or experience vomiting:   Try to move around less.   Eat less than usual or drink only liquids until the next morning   Nausea should resolve in about 24 hours    If you have a problem when you are at home:    Call your surgeons office         Discharge Instructions: After Your Surgery  You’ve just had surgery. During surgery, you were given medicine called anesthesia to keep you relaxed and free of pain. After surgery, you may have some pain or nausea. This is common. Here are some tips for feeling better and getting well after surgery.   Going home  Your healthcare provider will show you how to take care of yourself when you go home. They'll also answer your questions. Have an adult family member or friend drive you home. For the first 24 hours after your surgery:   Don't drive or use heavy equipment.  Don't make important decisions or sign legal papers.  Take medicines as directed.  Don't drink alcohol.  Have someone stay with you, if needed. They can watch for problems and help keep you safe.  Be sure to go to all follow-up visits with your healthcare provider. And rest after your surgery for as long as your provider tells you to.   Coping with pain  If you have pain after surgery, pain medicine will help you feel better. Take it as directed, before pain becomes severe. Also, ask your healthcare provider or pharmacist about other ways to control pain. This might be with heat, ice, or relaxation. And follow any other instructions your surgeon or nurse gives you.      Stay on schedule with your medicine.     Tips for taking pain medicine  To get the best relief possible, remember these points:   Pain medicines can upset your stomach. Taking them with a little food may help.  Most pain relievers taken by mouth need at least 20 to 30 minutes to start to work.  Don't wait till your  pain becomes severe before you take your medicine. Try to time your medicine so that you can take it before starting an activity. This might be before you get dressed, go for a walk, or sit down for dinner.  Constipation is a common side effect of some pain medicines. Call your healthcare provider before taking any medicines such as laxatives or stool softeners to help ease constipation. Also ask if you should skip any foods. Drinking lots of fluids and eating foods such as fruits and vegetables that are high in fiber can also help. Remember, don't take laxatives unless your surgeon has prescribed them.  Drinking alcohol and taking pain medicine can cause dizziness and slow your breathing. It can even be deadly. Don't drink alcohol while taking pain medicine.  Pain medicine can make you react more slowly to things. Don't drive or run machinery while taking pain medicine.  Your healthcare provider may tell you to take acetaminophen to help ease your pain. Ask them how much you're supposed to take each day. Acetaminophen or other pain relievers may interact with your prescription medicines or other over-the-counter (OTC) medicines. Some prescription medicines have acetaminophen and other ingredients in them. Using both prescription and OTC acetaminophen for pain can cause you to accidentally overdose. Read the labels on your OTC medicines with care. This will help you to clearly know the list of ingredients, how much to take, and any warnings. It may also help you not take too much acetaminophen. If you have questions or don't understand the information, ask your pharmacist or healthcare provider to explain it to you before you take the OTC medicine.   Managing nausea  Some people have an upset stomach (nausea) after surgery. This is often because of anesthesia, pain, or pain medicine, less movement of food in the stomach, or the stress of surgery. These tips will help you handle nausea and eat healthy foods as you  get better. If you were on a special food plan before surgery, ask your healthcare provider if you should follow it while you get better. Check with your provider on how your eating should progress. It may depend on the surgery you had. These general tips may help:   Don't push yourself to eat. Your body will tell you when to eat and how much.  Start off with clear liquids and soup. They're easier to digest.  Next try semi-solid foods as you feel ready. These include mashed potatoes, applesauce, and gelatin.  Slowly move to solid foods. Don’t eat fatty, rich, or spicy foods at first.  Don't force yourself to have 3 large meals a day. Instead eat smaller amounts more often.  Take pain medicines with a small amount of solid food, such as crackers or toast. This helps prevent nausea.  When to call your healthcare provider  Call your healthcare provider right away if you have any of these:   You still have too much pain, or the pain gets worse, after taking the medicine. The medicine may not be strong enough. Or there may be a complication from the surgery.  You feel too sleepy, dizzy, or groggy. The medicine may be too strong.  Side effects such as nausea or vomiting. Your healthcare provider may advise taking other medicines to .  Skin changes such as rash, itching, or hives. This may mean you have an allergic reaction. Your provider may advise taking other medicines.  The incision looks different (for instance, part of it opens up).  Bleeding or fluid leaking from the incision site, and weren't told to expect that.  Fever of 100.4°F (38°C) or higher, or as directed by your provider.  Call 911  Call 911 right away if you have:   Trouble breathing  Facial swelling    If you have obstructive sleep apnea   You were given anesthesia medicine during surgery to keep you comfortable and free of pain. After surgery, you may have more apnea spells because of this medicine and other medicines you were given. The spells may last  longer than normal.    At home:  Keep using the continuous positive airway pressure (CPAP) device when you sleep. Unless your healthcare provider tells you not to, use it when you sleep, day or night. CPAP is a common device used to treat obstructive sleep apnea.  Talk with your provider before taking any pain medicine, muscle relaxants, or sedatives. Your provider will tell you about the possible dangers of taking these medicines.  Contact your provider if your sleeping changes a lot even when taking medicines as directed.  [employer] last reviewed this educational content on 10/1/2021  © 2820-9562 [employer]. All rights reserved. This information is not intended as a substitute for professional medical care. Always follow your healthcare professional's instructions.

## 2024-08-16 NOTE — H&P
[place]  OTOLARYNGOLOGY - HEAD & NECK SURGERY    7/11/2024     Reason for Consultation:   Nasal congestion, nasal valve collapse    History of Present Illness:   Patient is a pleasant 53 year old male who is being seen for chronic nasal congestion which first began after having trauma to his nose many years ago.  Recently he was seen by Dr. Galvan and had surgery in December for bilateral Latera implant placement, septoplasty, and bilateral inferior turbinate Coblation.  The patient states that he does not feel much better after the surgery.  He is also concerned about a septal perforation that was seen postoperatively on exam.  He does not have any significant nasal crusting or dryness.  He is here for second opinion and potential further surgery for his problem.  Patient states that when he lifts his cheek on both sides or when he lifts the tip of his nose he is able to breathe much much better.  He has been on multiple nasal sprays in the past including Flonase and azelastine.  He states that the nasal sprays did not help him breathe any better.    INTERVAL HISTORY  5/28/2024: Patient had cold the day of surgery and had to cancel. States his breathing is still poor through his nose. States he feels better when he holds the tip of his nose up.  7/2/2024: Patient returns today after not being able to see Dr. Walker due to insurance purposes. He would like to proceed with open functional rhinoplasty with cadaveric irradiated rib cartilage.    Past Medical History  Past Medical History:    Anxiety state    Asthma (HCC)    Depression    Esophageal reflux    Sleep apnea    CPAP       Past Surgical History  Past Surgical History:   Procedure Laterality Date    Other surgical history Bilateral     nasal septoplasty    Repair ing hernia,4+y/o,reducibl  2001       Family History  Family History   Problem Relation Age of Onset    Diabetes Father     Heart Surgery Father         Artery repair     Diabetes Mother         Social History  Pediatric History   Patient Parents    Not on file     Other Topics Concern    Not on file   Social History Narrative    Not on file           Current Medications:  No current outpatient medications on file.       Allergies  Allergies   Allergen Reactions    Penicillin G HIVES     Denies skin peeling/blister, organ damage/failure         Review of Systems:   A comprehensive 10 point review of systems was completed.  Pertinent positives and negatives noted in the the HPI.    Physical Exam:   Blood pressure 126/83, pulse 77, temperature 98.7 °F (37.1 °C), temperature source Oral, resp. rate 18, height 5' 8\" (1.727 m), weight 207 lb (93.9 kg), SpO2 96%.    GENERAL: No acute distress, Comfortable appearing  FACE: HB 1/6, Normal Animation  HEAD: Normocephalic  EYES: EOMI, pupils equil  EARS: Bilateral Auricles Symmetric  NOSE: Nares patent bilaterally, there is tip ptosis, there is narrowing in the bilateral internal nasal valve area especially on the left, there is notable improvement in breathing using the modified [place] maneuver bilaterally  ORAL CAVITY: Tongue mobile, Oropharynx clear, Floor of mouth clear, Posterior oropharynx normal  NECK: No palpable lymphadenopathy, thyroid not palpable, nontender    PROCEDURE: BILATERAL RIGID NASAL ENDOSCOPY - As performed on 7/2/2024  Bilateral rigid nasal endoscopy (35644) was performed. Verbal consent was obtained from the patient to proceed with rigid nasal endoscopy. The nasal cavity was decongested and topically anesthetized with a combination of Oxymetazoline and 4% Lidocaine. A rigid 4mm 30 degree nasal endoscope connected to a high-definition endoscopy system was used to examine both nasal cavities. Digital photos and/or videos of relevant exam findings were obtained. The inferior meatus, inferior turbinate, nasopharynx, middle meatus, middle turbinate, superior meatus, superior turbinate, and sphenoethmoidal recess were examined bilaterally and  deemed to be normal, with any exceptions as noted below. At the completion of the procedure the endoscope was removed. The patient tolerated the procedure well. There were no complications.    Findings: The bilateral inferior turbinates were enlarged. The Septum was deviated to the left caudally, there is a nasal septal perforation of the superior septum just anterior to the heads of the middle turbinates. The middle meatus was patent bilaterally, no pus drainage. There were no obvious masses or polyps noted.      Results:     Laboratory Data:  Lab Results   Component Value Date    WBC 7.4 07/26/2024    HGB 14.9 07/26/2024    HCT 46.4 07/26/2024    .0 07/26/2024    CREATSERUM 0.88 07/26/2024    BUN 18 07/26/2024     07/26/2024    K 4.4 07/26/2024     07/26/2024    CO2 27.0 07/26/2024    GLU 98 07/26/2024    CA 9.2 07/26/2024    ALB 4.4 07/26/2024    ALKPHO 59 07/26/2024    TP 7.2 07/26/2024    AST 25 07/26/2024    ALT 16 07/26/2024    TSH 1.817 07/26/2024    LIP 84 01/05/2023         Imaging:  No results found.      Impression:   Bilateral nasal valve collapse  Deviated nasal septum  Bilateral inferior turbinate hypertrophy  Nasal congestion  Nasal obstruction    Recommendations:  I discussed with the patient open rhinoplasty approach, and repair of the nasal valves potentially using cartilage grafting.  However, due to the patient's previous septoplasty I am unsure how much cartilage is left for grafting.  I also offered an approach which does not need cartilage grafts.     I believe the patient is a good candidate for open rhinoplasty appoach for revision septoplasty and septal perforation repair, revision bilateral inferior turbinate submucosal resection, bilateral repair of nasal valve collapse, and placement of columellar strut. We discussed risks and benefits of surgery with the patient and daughter in detail.    We again spoke today regarding the surgical plan.  We discussed possible graft  rejection, persistence of septal perforation, and need for further surgery, as well as widened appearance of the nose.     Thank you for allowing me to participate in the care of your patient.    Pre-op Diagnosis: Nasal valve collapse [M95.0]  Deviated nasal septum [J34.2]  Hypertrophy of nasal turbinates [J34.3]  Obstructive sleep apnea [G47.33]  Nasal congestion [R09.81]  Glossitis [K14.0]    The above referenced H&P was reviewed by Landry Dixon DO on 8/16/2024, the patient was examined and no significant changes have occurred in the patient's condition since the H&P was performed.  I discussed with the patient and/or legal representative the potential benefits, risks and side effects of this procedure; the likelihood of the patient achieving goals; and potential problems that might occur during recuperation.  I discussed reasonable alternatives to the procedure, including risks, benefits and side effects related to the alternatives and risks related to not receiving this procedure.  We will proceed with procedure as planned.    Landry Dixon DO   Otolaryngology/Rhinology, Sinus, and Endoscopic Skull Base Surgery  [facility] [address] [place] [id number]  Phone [phone]  Fax [phone]  3/11/2024  5:40 PM  7/11/2024

## 2024-08-16 NOTE — ANESTHESIA PROCEDURE NOTES
Airway  Date/Time: 8/16/2024 10:23 AM  Urgency: Elective    Airway not difficult    General Information and Staff    Patient location during procedure: OR  Anesthesiologist: Jim Reddy MD  Resident/CRNA: Niyah Faust CRNA  Performed: CRNA   Performed by: Niyah Faust CRNA  Authorized by: Jim Reddy MD      Indications and Patient Condition  Indications for airway management: anesthesia  Spontaneous Ventilation: absent  Sedation level: deep  Preoxygenated: yes  Patient position: sniffing  MILS maintained throughout  Mask difficulty assessment: 2 - vent by mask + OA or adjuvant +/- NMBA    Final Airway Details  Final airway type: endotracheal airway      Successful airway: ETT  Cuffed: yes   Successful intubation technique: direct laryngoscopy  Facilitating devices/methods: intubating stylet  Endotracheal tube insertion site: oral  Blade: Mason  Blade size: #3  ETT size (mm): 8.0    Cormack-Lehane Classification: grade IIA - partial view of glottis  Placement verified by: capnometry   Measured from: lips (oral [place])  Number of attempts at approach: 1  Ventilation between attempts: none  Number of other approaches attempted: 0

## 2024-08-16 NOTE — ANESTHESIA POSTPROCEDURE EVALUATION
Patient: Vik Frey    Procedure Summary       Date: 08/16/24 Room / Location: [facility] MAIN OR  / [facility] MAIN OR    Anesthesia Start: 1015 Anesthesia Stop: 1418    Procedures:       Open functional rhinoplasty, endoscopic septal perforation repair, bilateral inferior turbinate submucosal resection (Bilateral: Nose)      NASAL SEPTOPLASTY TURBINECTOMY (Bilateral: Nose) Diagnosis:       Nasal valve collapse      Deviated nasal septum      Hypertrophy of nasal turbinates      Obstructive sleep apnea      Nasal congestion      Glossitis      (Nasal valve collapse [M95.0]Deviated nasal septum [J34.2]Hypertrophy of nasal turbinates [J34.3]Obstructive sleep apnea [G47.33]Nasal congestion [R09.81]Glossitis [K14.0])    Surgeons: Landry Dixon DO Anesthesiologist: Jim Reddy MD    Anesthesia Type: general ASA Status: 2            Anesthesia Type: general    Vitals Value Taken Time   /80 08/16/24 1418   Temp 97 °F (36.1 °C) 08/16/24 1418   Pulse 108 08/16/24 1418   Resp 18 08/16/24 1418   SpO2 96 % 08/16/24 1418   Vitals shown include unfiled device data.    EM AN Post Evaluation:   Patient Evaluated in PACU  Patient Participation: complete - patient participated  Level of Consciousness: sleepy but conscious and confused  Pain Score: 0  Pain Management: adequate  Airway Patency:patent  Dental exam unchanged from preop  Yes    Nausea/Vomiting: none  Cardiovascular Status: acceptable  Respiratory Status: acceptable and face mask  Postoperative Hydration acceptable      Niyah Faust CRNA  8/16/2024 2:18 PM

## 2024-08-16 NOTE — OPERATIVE REPORT
OTOLARYNGOLOGY/HEAD & NECK SURGERY  OPERATIVE REPORT    Patient Name: Vik Frey     Date of Surgery: 8/16/2024      Attending Surgeon: Landry Dixon DO     Anesthesia type: General     PREOPERATIVE DIAGNOSIS:   (1) Bilateral nasal valve stenosis  (2) Septal perforation  (3) Septal deviation to the right at the crest  (4) Bilateral Inferior turbinate hypertrophy.      POSTOPERATIVE DIAGNOSIS:   Same    OPERATION:   (1) Endoscopic Septoplasty (05614)  (2) Endoscopic Septal perforation repair (44282)  (2) Open functional rhinoplasty with placement of  grafts and columellar strut for repair of nasal valve stenosis (92355-48)   (3) Bilateral inferior turbinate submucous resection (20250-42)     OPERATIVE FINDINGS:   (1) Nasal septal deviation to the right caudally at the crest  (2) Bilateral Inferior Turbinate Hypertrophy  (3) Significant internal nasal valve collapse.      DESCRIPTION OF PROCEDURE:      The patient was brought to the operating room after all risks and benefits of the procedure were explained and consent was obtained. The patient was positively identified by the attending surgeon and was brought into the Operating Room and placed in the supine position. After induction of general anesthesia, the patient was orotracheally intubated and the tube was secured on the left side. The table was rotated to 90 degrees, all pressure points carefully padded including gel rolls around both elbows, padding underneath the heels, and a foam donut underneath the head.  The eyes were protected with Steri-Strips and visible at all times during the surgery. Nasal pledgets soaked in topical adrenaline 1:1000 were inserted into the bilateral nasal cavities for decongestion.  Intravenous antibiotics were administered. The patient was drapped in the typical fashion for open rhinoplasty and endoscopic nasal surgery with exposure of the eyes for continuous monitoring.  A time-out was then performed.     The  pledgets were removed and the nasal cavities were examined with a 30-degree rigid Storz endoscope.  The nasal septum was noted to be deviated to the left with a right caudal deflection.  There was a large bony spur which was obstructing the entire left nasal cavity.  The bilateral inferior turbinates were noted to be enlarged.  Also noted was narrowing of the bilateral internal nasal valves.     We began with the submucous resection of the bilateral inferior turbinates.  The bilateral nasal cavities were then infiltrated with 1% lidocaine with 1:100,000 of epinephrine solution.  Starting on the right side, a 45 degree through cut was used to resect the area of the head of the inferior turbinate in its axilla. Following this, the most inferior and lateral portion of the soft tissue coverage of the inferior turbinate was resected. Soft tissue component was elevated in a subperiosteal layer using a Jhonny elevator. A straight Jaron-Cut forceps was used to resect the most anterior portions of the bony inferior turbinate. Hemostasis was obtained using suction cautery, and the turbinate was lateralized using a Ackerman elevator. The same procedure was performed on the left side.     Next, we turned our attention to the nasal septum. The bilateral nasal septum was infiltrated with 1% lidocaine with 1:100,000 of epinephrine solution. An incision was then made on the right side inferior to the large inferior septal spur. This was brought also anterior to the deflected portion of the caudal septum. We then elevated the sub-mucoperichondrial flap on that side exposing the entirety of the deviated portion of the septum. As well as  the flaps in the area of the septal perforation.Next, the deviated crest was isolated and resected using an open biting staci rachel forceps. All deviated portions of the bone was resected using the small rachel.  A small septal cartilage graft was harvested from the remaining septum  for later use to repair the perforation. I then mobilized a superiorly based mucosal flap to rotate downward and cover the perforation from the right side. The small cartilage graft was then placed in between as a second layer. This was sutured in place using 4-0 plain gut on a straight needle in a quilting fashion.     I then turned my attention to the open functional rhinoplasty portion of the surgery.  A notched incision was made in the columella using an 11 blade and 15 blade scalpel.  Dissection was carried onto the medial crural cartilages.  Dissection was carried in a cranial fashion along the cartilage and laterally around the domes.  The incision was then connected to marginal incisions bilaterally.  This allowed for the soft tissue envelope to be opened.  Dissection was then carried superiorly along the nasal septum and then onto the nasal bones.  This was done with mostly blunt dissection.  The dorsal cartilage was conservatively resected using a 15 blade.  The domes were then divided and dissection was carried down onto the nasal septum.  The upper lateral cartilages were then detached from the septum.  Using the irradiated cadaveric cartilage  grafts were fashioned and placed straddling the septum to widen the internal nasal valve.  These were secured in place using 4-0 PDS suture placed in the horizontal mattress fashion.  This did mildly widen the middle third of the nose to allow for better breathing.  I then turned my attention to the nasal tip.  Very conservative cephalic trims were performed.  Domal sutures were then placed to reestablish the nasal tip definition.  This was done using 5-0 PDS suture placed in the mattress fashion.  The domes were then brought together using 5-0 PDS suture placed in a horizontal mattress fashion care was taken not to tighten these too tight to avoid a Uni tip deformity.  I then placed a columellar strut from the cadaveric cartilage and sutured this to the  medial crura to allow better tip support and to help keep the external nasal valve open.  The incisions were then closed using 4-0 chromic for the marginal incisions, 5-0 plain gut for the lateral aspects of the notched incision, and 6-0 Prolene suture for the notched incision.  Mastisol Steri-Strips were applied to the nasal dorsum, and a small Thermo splint was applied to the nasal dorsum to help prevent edema of the soft tissues.     The septal flaps were then approximated using a 4-0 plain gut. Two Galdamez splints were then placed, 1 in each nasal cavity, lateral to the middle turbinate. The Galdamez splints were covered with bacitracin solution. The Galdamez splints were secured to the membranous columella using a 2-0 silk suture. The patient was then rotated back to the Anesthesia Team and was awakened and extubated having tolerated the procedure well, and then transferred to the PACU in stable condition.     Specimens: None     Complications: None     Estimated Blood Loss: 50 mL     Disposition: PACU     Condition: Stable

## 2024-08-19 ENCOUNTER — TELEPHONE (OUTPATIENT)
Dept: OTOLARYNGOLOGY | Facility: CLINIC | Age: 54
End: 2024-08-19

## 2024-08-19 NOTE — TELEPHONE ENCOUNTER
Pt is post op day 3 functional rhinoplasty, SMR. Per  Pt pt is doing well no bleeding, advised pt no bending or heavy lifting for the next week and pt is not to blow nose until seen by . Advised pt to keep using afrin as prescribed,  Reviewed post op medications. Advised pt to contact our office if any increased bleeding  (saturating more than 4 mustache dressings within the hour) or fever greater than 102. Pt verbalized understanding.

## 2024-08-23 ENCOUNTER — OFFICE VISIT (OUTPATIENT)
Facility: LOCATION | Age: 54
End: 2024-08-23

## 2024-08-23 DIAGNOSIS — M95.0 NASAL VALVE COLLAPSE: ICD-10-CM

## 2024-08-23 DIAGNOSIS — J34.3 HYPERTROPHY OF NASAL TURBINATES: ICD-10-CM

## 2024-08-23 DIAGNOSIS — R09.81 NASAL CONGESTION: Primary | ICD-10-CM

## 2024-08-23 DIAGNOSIS — J34.2 DEVIATED NASAL SEPTUM: ICD-10-CM

## 2024-08-23 PROCEDURE — 31237 NSL/SINS NDSC SURG BX POLYPC: CPT | Performed by: STUDENT IN AN ORGANIZED HEALTH CARE EDUCATION/TRAINING PROGRAM

## 2024-08-23 PROCEDURE — 99024 POSTOP FOLLOW-UP VISIT: CPT | Performed by: STUDENT IN AN ORGANIZED HEALTH CARE EDUCATION/TRAINING PROGRAM

## 2024-08-23 NOTE — PROGRESS NOTES
[place]  OTOLARYNGOLOGY - HEAD & NECK SURGERY    8/23/2024     Reason for Consultation:   Nasal congestion, nasal valve collapse    History of Present Illness:   Patient is a pleasant 53 year old male who is being seen for chronic nasal congestion which first began after having trauma to his nose many years ago.  Recently he was seen by Dr. Galvan and had surgery in December for bilateral Latera implant placement, septoplasty, and bilateral inferior turbinate Coblation.  The patient states that he does not feel much better after the surgery.  He is also concerned about a septal perforation that was seen postoperatively on exam.  He does not have any significant nasal crusting or dryness.  He is here for second opinion and potential further surgery for his problem.  Patient states that when he lifts his cheek on both sides or when he lifts the tip of his nose he is able to breathe much much better.  He has been on multiple nasal sprays in the past including Flonase and azelastine.  He states that the nasal sprays did not help him breathe any better.    INTERVAL HISTORY  5/28/2024: Patient had cold the day of surgery and had to cancel. States his breathing is still poor through his nose. States he feels better when he holds the tip of his nose up.  7/2/2024: Patient returns today after not being able to see Dr. Walker due to insurance purposes. He would like to proceed with open functional rhinoplasty with cadaveric irradiated rib cartilage.  8/23/2024: Patient is 1 week postop from open rhinoplasty approach with placement of  grafts, and columellar strut using cadaveric cartilage.  He also underwent endoscopic septal perforation repair, endoscopic revision septoplasty, and bilateral inferior turbinate submucosal resection.  He is here for splint removal, external cast removal, suture removal, and debridement.    Past Medical History  Past Medical History:    Anxiety state    Asthma (HCC)    Depression     Esophageal reflux    Sleep apnea    CPAP       Past Surgical History  Past Surgical History:   Procedure Laterality Date    Other surgical history Bilateral     nasal septoplasty    Repair ing hernia,4+y/o,reducibl  2001    Sinus surgery        Open functional rhinoplasty, endoscopic septal perforation repair, bilateral inferior turbinate submucosal resection       Family History  Family History   Problem Relation Age of Onset    Diabetes Father     Heart Surgery Father         Artery repair     Diabetes Mother        Social History  Pediatric History   Patient Parents    Not on file     Other Topics Concern    Not on file   Social History Narrative    Not on file           Current Medications:  Current Outpatient Medications   Medication Sig Dispense Refill    acetaminophen-codeine 300-30 MG Oral Tab Take 2 tablets by mouth every 6 (six) hours as needed for Pain. 30 tablet 0    oxymetazoline 0.05 % Nasal Solution 2 sprays by Nasal route 2 (two) times daily. each nostril morning and evening. Use for 3 days then stop 30 mL 0    sulfamethoxazole-trimethoprim -160 MG Oral Tab per tablet Take 1 tablet by mouth 2 (two) times daily for 7 days. 14 tablet 0    PREDNISONE 10 MG Oral Tab Take 50mg x 2 days, take 40mg x 2 days, take 30mg x 2days, take 20 mg x 2 days, take 10mg x 2 days 30 tablet 0    clobetasol 0.05 % External Cream Apply 1 Application topically 2 (two) times daily. 1 each 0    OMEPRAZOLE 40 MG Oral Capsule Delayed Release TAKE 1 CAPSULE(40 MG) BY MOUTH DAILY (Patient taking differently: Take 1 capsule (40 mg total) by mouth daily. Pt takes every other day) 90 capsule 0    albuterol 108 (90 Base) MCG/ACT Inhalation Aero Soln Inhale 2 puffs into the lungs every 6 (six) hours as needed for Wheezing or Shortness of Breath. 1 each 11    clotrimazole-betamethasone 1-0.05 % External Cream Apply 1 Application topically 2 (two) times daily as needed. 60 g 3    Sildenafil Citrate 50 MG Oral Tab Take 1 tablet (50  mg total) by mouth daily as needed for Erectile Dysfunction. 30 tablet 0    acetaminophen 325 MG Oral Tab Take 1 tablet (325 mg total) by mouth every 6 (six) hours as needed for Pain.         Allergies  Allergies   Allergen Reactions    Penicillin G HIVES     Denies skin peeling/blister, organ damage/failure         Review of Systems:   A comprehensive 10 point review of systems was completed.  Pertinent positives and negatives noted in the the HPI.    Physical Exam:   There were no vitals taken for this visit.    GENERAL: No acute distress, Comfortable appearing  FACE: HB 1/6, Normal Animation  HEAD: Normocephalic  EYES: EOMI, pupils equil  EARS: Bilateral Auricles Symmetric  NOSE: Nares patent bilaterally, the tip has some edema, incision appears clean and dry and is healing well.  ORAL CAVITY: Tongue mobile, Oropharynx clear, Floor of mouth clear, Posterior oropharynx normal  NECK: No palpable lymphadenopathy, thyroid not palpable, nontender    PROCEDURE: BILATERAL RIGID NASAL ENDOSCOPY WITH DEBRIDEMENT  Bilateral rigid nasal endoscopy with debridement (97297) was performed after nasal surgery in order to prevent infection, promote healing, and improve the nasal airway. Verbal consent was obtained from the patient to proceed with rigid nasal endoscopy. The nasal cavity was decongested and topically anesthetized with a combination of Oxymetazoline and 4% Lidocaine. A rigid 4mm 30 degree nasal endoscope connected to a high-definition video endoscopy system was used to examine both nasal cavities. The inferior meatus, inferior turbinate, nasopharynx, middle meatus, middle turbinate, superior meatus, superior turbinate, and sphenoethmoidal recess were examined bilaterally, with any exceptions as noted below. At the completion of the procedure the endoscope was removed. The patient tolerated the procedure well. There were no complications.    Findings: Galdamez splints removed, and external nasal splint removed.   Crusting, old clots, and necrotic tissue were removed endoscopically using a combination of Blakesley Forceps and Straight Suction to prevent infection, promote healing, and improve the nasal airway. The bilateral inferior turbinates were reduced and healing well with some fibrinous tissue anteriorly which was debrided using suction and alligator forceps. The Septum was midline. The middle meatus was patent bilaterally without any pus drainage. There were no obvious masses or polyps noted.    Results:     Laboratory Data:  Lab Results   Component Value Date    WBC 7.4 07/26/2024    HGB 14.9 07/26/2024    HCT 46.4 07/26/2024    .0 07/26/2024    CREATSERUM 0.88 07/26/2024    BUN 18 07/26/2024     07/26/2024    K 4.4 07/26/2024     07/26/2024    CO2 27.0 07/26/2024    GLU 98 07/26/2024    CA 9.2 07/26/2024    ALB 4.4 07/26/2024    ALKPHO 59 07/26/2024    TP 7.2 07/26/2024    AST 25 07/26/2024    ALT 16 07/26/2024    TSH 1.817 07/26/2024    LIP 84 01/05/2023         Imaging:  No results found.      Impression:   Bilateral nasal valve collapse  Deviated nasal septum  Bilateral inferior turbinate hypertrophy  Nasal congestion  Nasal obstruction    Recommendations:  Continue nasal saline spray 3-4 times a day.  Continue Vaseline application to the incision.  Follow-up in 1 week for repeat debridement    Landry Dixon DO   Otolaryngology/Rhinology, Sinus, and Endoscopic Skull Base Surgery  [facility]   [address] [place] [address]  Phone [phone]  Fax [phone]  3/11/2024  5:40 PM  8/23/2024

## 2024-08-30 ENCOUNTER — OFFICE VISIT (OUTPATIENT)
Facility: LOCATION | Age: 54
End: 2024-08-30

## 2024-08-30 DIAGNOSIS — J34.3 HYPERTROPHY OF NASAL TURBINATES: ICD-10-CM

## 2024-08-30 DIAGNOSIS — J34.2 DEVIATED SEPTUM: ICD-10-CM

## 2024-08-30 DIAGNOSIS — J34.2 DEVIATED NASAL SEPTUM: ICD-10-CM

## 2024-08-30 DIAGNOSIS — M95.0 NASAL VALVE COLLAPSE: ICD-10-CM

## 2024-08-30 DIAGNOSIS — R09.81 NASAL CONGESTION: Primary | ICD-10-CM

## 2024-08-30 PROCEDURE — 31237 NSL/SINS NDSC SURG BX POLYPC: CPT | Performed by: STUDENT IN AN ORGANIZED HEALTH CARE EDUCATION/TRAINING PROGRAM

## 2024-08-30 PROCEDURE — 99024 POSTOP FOLLOW-UP VISIT: CPT | Performed by: STUDENT IN AN ORGANIZED HEALTH CARE EDUCATION/TRAINING PROGRAM

## 2024-08-30 RX ORDER — SOD CHLOR,BICARB/SQUEEZ BOTTLE
PACKET, WITH RINSE DEVICE NASAL
Qty: 1 EACH | Refills: 0 | Status: SHIPPED | OUTPATIENT
Start: 2024-08-30

## 2024-08-30 RX ORDER — SOD CHLOR,BICARB/SQUEEZ BOTTLE
PACKET, WITH RINSE DEVICE NASAL
Qty: 50 EACH | Refills: 2 | Status: SHIPPED | OUTPATIENT
Start: 2024-08-30

## 2024-08-30 NOTE — PROGRESS NOTES
[place]  OTOLARYNGOLOGY - HEAD & NECK SURGERY    8/30/2024     Reason for Consultation:   Nasal congestion, nasal valve collapse    History of Present Illness:   Patient is a pleasant 53 year old male who is being seen for chronic nasal congestion which first began after having trauma to his nose many years ago.  Recently he was seen by Dr. Galvan and had surgery in December for bilateral Latera implant placement, septoplasty, and bilateral inferior turbinate Coblation.  The patient states that he does not feel much better after the surgery.  He is also concerned about a septal perforation that was seen postoperatively on exam.  He does not have any significant nasal crusting or dryness.  He is here for second opinion and potential further surgery for his problem.  Patient states that when he lifts his cheek on both sides or when he lifts the tip of his nose he is able to breathe much much better.  He has been on multiple nasal sprays in the past including Flonase and azelastine.  He states that the nasal sprays did not help him breathe any better.    INTERVAL HISTORY  5/28/2024: Patient had cold the day of surgery and had to cancel. States his breathing is still poor through his nose. States he feels better when he holds the tip of his nose up.  7/2/2024: Patient returns today after not being able to see Dr. Walker due to insurance purposes. He would like to proceed with open functional rhinoplasty with cadaveric irradiated rib cartilage.  8/23/2024: Patient is 1 week postop from open rhinoplasty approach with placement of  grafts, and columellar strut using cadaveric cartilage.  He also underwent endoscopic septal perforation repair, endoscopic revision septoplasty, and bilateral inferior turbinate submucosal resection.  He is here for splint removal, external cast removal, suture removal, and debridement.  8/30/2024: Patient is 2 weeks postop today. Has been having crusting and congestion bilaterally.  Some swelling to the left nose.    Past Medical History  Past Medical History:    Anxiety state    Asthma (HCC)    Depression    Esophageal reflux    Sleep apnea    CPAP       Past Surgical History  Past Surgical History:   Procedure Laterality Date    Other surgical history Bilateral     nasal septoplasty    Repair ing hernia,5+y/o,reducibl  2001    Sinus surgery        Open functional rhinoplasty, endoscopic septal perforation repair, bilateral inferior turbinate submucosal resection       Family History  Family History   Problem Relation Age of Onset    Diabetes Father     Heart Surgery Father         Artery repair     Diabetes Mother        Social History  Pediatric History   Patient Parents    Not on file     Other Topics Concern    Not on file   Social History Narrative    Not on file           Current Medications:  Current Outpatient Medications   Medication Sig Dispense Refill    Hypertonic Nasal Wash (SINUS RINSE BOTTLE KIT) Nasal Powd Pack Please use the Neilmed Sinus Rinse Squeeze Bottle. Fill the bottle to the dotted line with distilled or appropriately filtered water (DO NOT USE TAP WATER). Add in 1 Neilmed saline powder pouch. Lean over a sink and tilt your head down. Irrigate each nasal cavity with half of the bottle. Do this 2 times a day until you are told to stop. 1 each 0    Hypertonic Nasal Wash (SINUS RINSE KIT) Nasal Powd Pack Please use the [employer]med Sinus Rinse Squeeze Bottle. Fill the bottle to the dotted line with distilled or appropriately filtered water (DO NOT USE TAP WATER). Add in 1 Neilmed saline powder pouch. Lean over a sink and tilt your head down. Irrigate each nasal cavity with half of the bottle. Do this 2 times a day until you are told to stop. 50 each 2    acetaminophen-codeine 300-30 MG Oral Tab Take 2 tablets by mouth every 6 (six) hours as needed for Pain. 30 tablet 0    oxymetazoline 0.05 % Nasal Solution 2 sprays by Nasal route 2 (two) times daily. each nostril morning and  evening. Use for 3 days then stop 30 mL 0    PREDNISONE 10 MG Oral Tab Take 50mg x 2 days, take 40mg x 2 days, take 30mg x 2days, take 20 mg x 2 days, take 10mg x 2 days 30 tablet 0    clobetasol 0.05 % External Cream Apply 1 Application topically 2 (two) times daily. 1 each 0    OMEPRAZOLE 40 MG Oral Capsule Delayed Release TAKE 1 CAPSULE(40 MG) BY MOUTH DAILY (Patient taking differently: Take 1 capsule (40 mg total) by mouth daily. Pt takes every other day) 90 capsule 0    albuterol 108 (90 Base) MCG/ACT Inhalation Aero Soln Inhale 2 puffs into the lungs every 6 (six) hours as needed for Wheezing or Shortness of Breath. 1 each 11    clotrimazole-betamethasone 1-0.05 % External Cream Apply 1 Application topically 2 (two) times daily as needed. 60 g 3    Sildenafil Citrate 50 MG Oral Tab Take 1 tablet (50 mg total) by mouth daily as needed for Erectile Dysfunction. 30 tablet 0    acetaminophen 325 MG Oral Tab Take 1 tablet (325 mg total) by mouth every 6 (six) hours as needed for Pain.         Allergies  Allergies   Allergen Reactions    Penicillin G HIVES     Denies skin peeling/blister, organ damage/failure         Review of Systems:   A comprehensive 10 point review of systems was completed.  Pertinent positives and negatives noted in the the HPI.    Physical Exam:   There were no vitals taken for this visit.    GENERAL: No acute distress, Comfortable appearing  FACE: HB 1/6, Normal Animation  HEAD: Normocephalic  EYES: EOMI, pupils equil  EARS: Bilateral Auricles Symmetric  NOSE: Nares patent bilaterally, the tip has some edema, incision appears clean and dry and is healing well.  ORAL CAVITY: Tongue mobile, Oropharynx clear, Floor of mouth clear, Posterior oropharynx normal  NECK: No palpable lymphadenopathy, thyroid not palpable, nontender    PROCEDURE: BILATERAL RIGID NASAL ENDOSCOPY WITH DEBRIDEMENT  Bilateral rigid nasal endoscopy with debridement (01161) was performed after nasal surgery in order to  prevent infection, promote healing, and improve the nasal airway. Verbal consent was obtained from the patient to proceed with rigid nasal endoscopy. The nasal cavity was decongested and topically anesthetized with a combination of Oxymetazoline and 4% Lidocaine. A rigid 4mm 30 degree nasal endoscope connected to a high-definition video endoscopy system was used to examine both nasal cavities. The inferior meatus, inferior turbinate, nasopharynx, middle meatus, middle turbinate, superior meatus, superior turbinate, and sphenoethmoidal recess were examined bilaterally, with any exceptions as noted below. At the completion of the procedure the endoscope was removed. The patient tolerated the procedure well. There were no complications.    Findings: Crusting, old clots, and necrotic tissue were removed endoscopically using a combination of Blakesley Forceps and Straight Suction to prevent infection, promote healing, and improve the nasal airway. The bilateral inferior turbinates were reduced and healing well with some significant crusting anteriorly which was debrided using suction and alligator forceps. There was also crusting of the posterior portion of the left inferior turbinate which was removed. The Septum was midline. The middle meatus was patent bilaterally without any pus drainage. There were no obvious masses or polyps noted. Photodocumentation was taken today    Results:     Laboratory Data:  Lab Results   Component Value Date    WBC 7.4 07/26/2024    HGB 14.9 07/26/2024    HCT 46.4 07/26/2024    .0 07/26/2024    CREATSERUM 0.88 07/26/2024    BUN 18 07/26/2024     07/26/2024    K 4.4 07/26/2024     07/26/2024    CO2 27.0 07/26/2024    GLU 98 07/26/2024    CA 9.2 07/26/2024    ALB 4.4 07/26/2024    ALKPHO 59 07/26/2024    TP 7.2 07/26/2024    AST 25 07/26/2024    ALT 16 07/26/2024    TSH 1.817 07/26/2024    LIP 84 01/05/2023         Imaging:  No results found.      Impression:   Bilateral  nasal valve collapse  Deviated nasal septum  Bilateral inferior turbinate hypertrophy  Nasal congestion  Nasal obstruction    Recommendations:  Switch to nasal saline rinses.  Continue Vaseline application to the incision.  Follow-up in 2 weeks    Landry Dixon DO   Otolaryngology/Rhinology, Sinus, and Endoscopic Skull Base Surgery  [address] [address]  Phone [phone]  Fax [phone]  3/11/2024  5:40 PM  8/30/2024

## 2024-09-04 ENCOUNTER — HOSPITAL ENCOUNTER (OUTPATIENT)
Age: 54
Discharge: HOME | End: 2024-09-04
Attending: EMERGENCY MEDICINE
Payer: COMMERCIAL

## 2024-09-04 VITALS
HEIGHT: 68 IN | BODY MASS INDEX: 30.77 KG/M2 | TEMPERATURE: 98 F | SYSTOLIC BLOOD PRESSURE: 159 MMHG | DIASTOLIC BLOOD PRESSURE: 85 MMHG | WEIGHT: 203 LBS | HEART RATE: 95 BPM | OXYGEN SATURATION: 96 % | RESPIRATION RATE: 16 BRPM

## 2024-09-04 DIAGNOSIS — T78.40XA ALLERGIC REACTION, INITIAL ENCOUNTER: Primary | ICD-10-CM

## 2024-09-04 PROCEDURE — 99213 OFFICE O/P EST LOW 20 MIN: CPT

## 2024-09-04 RX ORDER — PREDNISONE 20 MG/1
40 TABLET ORAL DAILY
Qty: 10 TABLET | Refills: 0 | Status: SHIPPED | OUTPATIENT
Start: 2024-09-04 | End: 2024-09-09

## 2024-09-04 RX ORDER — HYDROXYZINE HYDROCHLORIDE 25 MG/1
25 TABLET, FILM COATED ORAL EVERY 6 HOURS PRN
Qty: 20 TABLET | Refills: 0 | Status: SHIPPED | OUTPATIENT
Start: 2024-09-04 | End: 2024-10-04

## 2024-09-04 NOTE — ED PROVIDER NOTES
Patient Seen in: [facility]      History     Chief Complaint   Patient presents with    Rash Skin Problem     Stated Complaint: rash    Subjective:   HPI    53-year-old male with history of anxiety asthma depression gastroesophageal reflux presents to the immediate care for complaints of a rash.  Patient started noticing a rash to his arms and his right side starting 3 days ago.  Denies any lip swelling or tongue swelling.  Denies fevers or chills.  Denies any recent ill contact.  He has not been outside or near any shrubs or bushes.  He states that he has not been doing any hiking.  Patient denies any new medications.  Denies any trouble with breathing.  Denies any shortness of breath.  Denies any lip swelling or tongue swelling.    Objective:   Past Medical History:    Anxiety state    Asthma (HCC)    Depression    Esophageal reflux    Sleep apnea    CPAP              Past Surgical History:   Procedure Laterality Date    Other surgical history Bilateral     nasal septoplasty    Repair ing hernia,5+y/o,reducibl  2001    Sinus surgery        Open functional rhinoplasty, endoscopic septal perforation repair, bilateral inferior turbinate submucosal resection                Social History     Socioeconomic History    Marital status:    Tobacco Use    Smoking status: Former     Types: Cigarettes    Smokeless tobacco: Never   Vaping Use    Vaping status: Never Used   Substance and Sexual Activity    Alcohol use: Not Currently     Comment: ocassionally    Drug use: Never              Review of Systems    Positive for stated Chief Complaint: Rash Skin Problem    Other systems are as noted in HPI.  Constitutional and vital signs reviewed.      All other systems reviewed and negative except as noted above.    Physical Exam     ED Triage Vitals [09/04/24 1240]   /85   Pulse 95   Resp 16   Temp 98.2 °F (36.8 °C)   Temp src Oral   SpO2 96 %   O2 Device None (Room air)       Current Vitals:   Vital  Signs  BP: 159/85  Pulse: 95  Resp: 16  Temp: 98.2 °F (36.8 °C)  Temp src: Oral    Oxygen Therapy  SpO2: 96 %  O2 Device: None (Room air)            Physical Exam    General: Alert and oriented. No acute distress.  HEENT: Normocephalic. No evidence of trauma. Extraocular movements are intact.  Cardiovascular exam: Regular rate and rhythm  Lungs: Clear to auscultation bilaterally.  Abdomen: Soft, nondistended, nontender.He has noted to have an urticarial rash to his right flank.  Extremities: No evidence of deformity. No patient is noted to have a raised clubbing or cyanosis.  Papular rash to both arms.    Neuro: No focal deficit is noted.    ED Course   Labs Reviewed - No data to display  Unclear etiology for patient's rash.  Appears to be allergic in nature.  Patient will be discharged home with prednisone for 5 days and Atarax.  Recommend follow-up with his primary care provider       MDM   Patient was screened and evaluated during this visit.   As a treating physician attending to the patient, I determined, within reasonable clinical confidence and prior to discharge, that an emergency medical condition was not or was no longer present.  There was no indication for further evaluation, treatment or admission on an emergency basis.  Comprehensive verbal and written discharge and follow-up instructions were provided to help prevent relapse or worsening.  Patient was instructed to follow-up with her primary care provider for further evaluation and treatment, but to return immediately to the ER for worsening, concerning, new, changing or persisting symptoms.  I discussed the case with the patient and they had no questions, complaints, or concerns.  Patient felt comfortable going home.    ^^Please note that this report has been produced using speech recognition software and may contain errors related to that system including, but not limited to, errors in grammar, punctuation, and spelling, as well as words and phrases  that possibly may have been recognized inappropriately.  If there are any questions or concerns, contact the dictating provider for clarification                                   MDM    Disposition and Plan     Clinical Impression:  1. Allergic reaction, initial encounter         Disposition:  Discharge  9/4/2024  1:24 pm    Follow-up:  Ori Burch MD  [id number]  [facility] [id number]  [phone]    Call   As needed, If symptoms worsen          Medications Prescribed:  Current Discharge Medication List        START taking these medications    Details   predniSONE 20 MG Oral Tab Take 2 tablets (40 mg total) by mouth daily for 5 days.  Qty: 10 tablet, Refills: 0      hydrOXYzine 25 MG Oral Tab Take 1 tablet (25 mg total) by mouth every 6 (six) hours as needed for Itching.  Qty: 20 tablet, Refills: 0

## 2024-09-04 NOTE — DISCHARGE INSTRUCTIONS
Follow-up with your primary care doctor as needed  Take prednisone 40 mg a day for 5 days  Take Atarax once every 6 hours for itching  Return if any worsening symptoms or new concern  
No

## 2024-09-04 NOTE — ED INITIAL ASSESSMENT (HPI)
Patient states about 2-3 days ago he started with a rash to the arms, back, and waistband area. States he has had some itching and pain to the sites of the rash. States yesterday he took benadryl a couple of times with some relief with the rash and itching. Denies any new medications, foods, changes in soap, detergent, etc.

## 2024-09-11 ENCOUNTER — OFFICE VISIT (OUTPATIENT)
Dept: FAMILY MEDICINE CLINIC | Facility: CLINIC | Age: 54
End: 2024-09-11
Payer: COMMERCIAL

## 2024-09-11 VITALS
RESPIRATION RATE: 16 BRPM | OXYGEN SATURATION: 96 % | BODY MASS INDEX: 30.77 KG/M2 | TEMPERATURE: 98 F | HEART RATE: 99 BPM | DIASTOLIC BLOOD PRESSURE: 78 MMHG | HEIGHT: 68 IN | WEIGHT: 203 LBS | SYSTOLIC BLOOD PRESSURE: 118 MMHG

## 2024-09-11 DIAGNOSIS — Z20.822 SUSPECTED COVID-19 VIRUS INFECTION: Primary | ICD-10-CM

## 2024-09-11 LAB
OPERATOR ID: NORMAL
POCT LOT NUMBER: NORMAL
RAPID SARS-COV-2 BY PCR: NOT DETECTED

## 2024-09-11 PROCEDURE — U0002 COVID-19 LAB TEST NON-CDC: HCPCS | Performed by: PHYSICIAN ASSISTANT

## 2024-09-11 PROCEDURE — 3078F DIAST BP <80 MM HG: CPT | Performed by: PHYSICIAN ASSISTANT

## 2024-09-11 PROCEDURE — 3008F BODY MASS INDEX DOCD: CPT | Performed by: PHYSICIAN ASSISTANT

## 2024-09-11 PROCEDURE — 3074F SYST BP LT 130 MM HG: CPT | Performed by: PHYSICIAN ASSISTANT

## 2024-09-11 PROCEDURE — 99213 OFFICE O/P EST LOW 20 MIN: CPT | Performed by: PHYSICIAN ASSISTANT

## 2024-09-11 NOTE — PROGRESS NOTES
[facility] Hospitalist    History and physical    Date of Service: 4/14/2022  Admitting Provider:Glenn Rangel MD  Primary Care Provider:No Pcp    CHIEF COMPLAINT:   Chief Complaint   Patient presents with   • Chest Pain Adult   • Shortness of Breath       HISTORY OF PRESENT ILLNESS: Rafita Ulrich is a 65 year old male with past medical history significant for COPD and hypercholesterolemia who is presenting with chest pain.    Per the patient, his pain started around noon today while he was unloading a heavy load from a machine, pain is central with no radiation, aggravated by movement and partially relieved by nitro in the EDassociated with shortness of breath, but no diaphoresis, palpations, nausea or vomiting.  the last 2 days patient has had intermittent lower abdominal cramping associated with loose watery diarrhea.  He denies any fevers or chills.  No recent cough, congestion, sick contacts.  No leg swelling, myalgias, recent bladder throat trouble.  Patient's last cocaine use was last night.  He denied any chest pain with cocaine or exertion in the past.  patient drinks alcohol every day.    Emergency Department Course:  Started on heparin drip, admitted for rule out.    Past Medical History:   Diagnosis Date   • COPD (chronic obstructive pulmonary disease) (CMS/HCC)    • High cholesterol    • Liver disease     Hepatitis C       Past Surgical History:   Procedure Laterality Date   • Fracture surgery      Right ankle  surgery   • Hernia repair         Home medications reviewed and reconciled in [facility]  Prior to Admission medications    Medication Sig Start Date End Date Taking? Authorizing Provider   hydrochlorothiazide (HYDRODIURIL) 12.5 MG tablet Take 1 tablet by mouth daily. 6/4/17   Gabrielle Azul MD   atorvastatin (LIPITOR) 20 MG tablet Take 20 mg by mouth daily.    Outside Provider   folic acid (FOLATE) 1 MG tablet Take 1 mg by mouth daily.  4/14/22  Outside Provider   albuterol 108 (90 Base)  CHIEF COMPLAINT:     Chief Complaint   Patient presents with    Chest Congestion     Headache for 3 days.  OTC meds taken.         HPI:   Vik Frey is a 53 year old male who presents with complaints of feeling sick for the past 3 days.  Symptoms include nasal congestion, nasal drainage, chest congestion, headache, and subjective fever.  The patient did not take his temperature with a thermometer.  He did not take any antipyretics today.  The patient denies sore throat, CP, SOB, wheezing, abdominal pain, vomiting, diarrhea, and rash.  The patient is requesting a COVID test.  Patient has nasal surgery 08/30/24.    Current Outpatient Medications   Medication Sig Dispense Refill    hydrOXYzine 25 MG Oral Tab Take 1 tablet (25 mg total) by mouth every 6 (six) hours as needed for Itching. 20 tablet 0    Hypertonic Nasal Wash (SINUS RINSE BOTTLE KIT) Nasal Powd Pack Please use the Neilmed Sinus Rinse Squeeze Bottle. Fill the bottle to the dotted line with distilled or appropriately filtered water (DO NOT USE TAP WATER). Add in 1 [employer]med saline powder pouch. Lean over a sink and tilt your head down. Irrigate each nasal cavity with half of the bottle. Do this 2 times a day until you are told to stop. 1 each 0    Hypertonic Nasal Wash (SINUS RINSE KIT) Nasal Powd Pack Please use the Neilmed Sinus Rinse Squeeze Bottle. Fill the bottle to the dotted line with distilled or appropriately filtered water (DO NOT USE TAP WATER). Add in 1 Neilmed saline powder pouch. Lean over a sink and tilt your head down. Irrigate each nasal cavity with half of the bottle. Do this 2 times a day until you are told to stop. 50 each 2    clobetasol 0.05 % External Cream Apply 1 Application topically 2 (two) times daily. 1 each 0    OMEPRAZOLE 40 MG Oral Capsule Delayed Release TAKE 1 CAPSULE(40 MG) BY MOUTH DAILY (Patient taking differently: Take 1 capsule (40 mg total) by mouth daily. Pt takes every other day) 90 capsule 0    albuterol 108 (90  Base) MCG/ACT Inhalation Aero Soln Inhale 2 puffs into the lungs every 6 (six) hours as needed for Wheezing or Shortness of Breath. 1 each 11    clotrimazole-betamethasone 1-0.05 % External Cream Apply 1 Application topically 2 (two) times daily as needed. 60 g 3    Sildenafil Citrate 50 MG Oral Tab Take 1 tablet (50 mg total) by mouth daily as needed for Erectile Dysfunction. 30 tablet 0    acetaminophen 325 MG Oral Tab Take 1 tablet (325 mg total) by mouth every 6 (six) hours as needed for Pain.        Past Medical History:    Anxiety state    Asthma (HCC)    Depression    Esophageal reflux    Sleep apnea    CPAP      Social History:  Social History     Socioeconomic History    Marital status:    Tobacco Use    Smoking status: Former     Types: Cigarettes    Smokeless tobacco: Never   Vaping Use    Vaping status: Never Used   Substance and Sexual Activity    Alcohol use: Not Currently     Comment: ocassionally    Drug use: Never        REVIEW OF SYSTEMS:   GENERAL: See HPI  SKIN: Denies rashes, skin wounds or ulcers.  EYES: Denies blurred vision or double vision  HENT: See HPI  CHEST: Denies chest pain, or palpitations  LUNGS: See HPI  GI: Denies abdominal pain, N/V/C/D.   MUSCULOSKELETAL: no arthralgia or swollen joints  LYMPH:  Denies lymphadenopathy  NEURO: Denies headaches or lightheadedness      EXAM:   /78   Pulse 99   Temp 98.2 °F (36.8 °C) (Oral)   Resp 16   Ht 5' 8\" (1.727 m)   Wt 203 lb (92.1 kg)   SpO2 96%   BMI 30.87 kg/m²   GENERAL: well developed, well nourished,in no apparent distress, cooperative   SKIN: no rashes, nosuspicious lesions, no abnormal pigmentation  HEAD: atraumatic, normocephalic  EYES: EOM intact, PERRL.  Conjunctiva normal.  Cornea clear.  Lid margins normal.  No active drainage.  EARS: Right TM normal, + bulging, no retraction, no fluid, bony landmarks normal.  Left TM normal, no bulging, no retraction, no fluid, bony landmarks normal.    NOSE: nostrils patent,  MCG/ACT inhaler Inhale 2 puffs into the lungs every 4 hours as needed for Shortness of Breath or Wheezing.  4/14/22  Outside Provider   cyclobenzaprine (FLEXERIL) 10 MG tablet Take 10 mg by mouth 3 times daily as needed for Muscle spasms.  4/14/22  Outside Provider   aspirin 81 MG tablet Take 81 mg by mouth daily.  4/14/22  Outside Provider       ALLERGIES:  No Known Allergies    No family history on file.    Social History:  Social History     Tobacco Use   • Smoking status: Current Every Day Smoker   • Smokeless tobacco: Never Used   Substance Use Topics   • Alcohol use: Yes   • Drug use: Yes     Types: Marijuana, Cocaine       Review of Systems:  All systems reviewed and otherwise unremarkable.    Physical Exam:  Vital 24 Hour Range Most Recent Value   Temperature Temp  Min: 97.2 °F (36.2 °C)  Max: 97.7 °F (36.5 °C) 97.2 °F (36.2 °C)     Pulse Pulse  Min: 80  Max: 84 81   Respiratory Resp  Min: 13  Max: 18 18   Blood Pressure BP  Min: 117/83  Max: 138/87 128/83   Pulse Oximetry SpO2  Min: 99 %  Max: 100 %     O2 No data recorded       Vital Most Recent Value First Value   Weight 66 kg (145 lb 8.1 oz) Weight: 64.2 kg (141 lb 8 oz)   Height 6' 1\" (185.4 cm) Height: 6' 1\" (185.4 cm)     General exam: The patient is not in acute pain or distress, with appropriate mood and affect.  HEENT: Atraumatic, normocephalic. Eyes without erythema, jaundice or conjunctival pallor. Pupils equal, round, reactive to light and accommodation. Extraocular movements intact.   Nose: No swelling, erythema, discharge or bleeding. Nasal mucosa without erythema or exudate.  Oral exam: Good dentition and oral hygeine. Moist oral mucosa without ulcer or lesions.  Pharynx: No swelling, erythema, or exudate.  Neck: Supple, no jugular venous distention or visible masses. Palpation with no tenderness, masses or thyromegaly. No cervical or supraclavicular lymphadenopathy. No carotid bruit.  Chest: Good areation with vesicular breathing. No added  + discharge, nasal mucosa pink and not inflamed.  No sinus tenderness.  THROAT: oral mucosa pink, moist and intact. No visible dental caries. Posterior pharynx without erythema or exudates.  NECK: supple, non-tender.  LUNGS: clear to auscultation bilaterally, no wheezes or rhonchi. Breathing is non labored.  CARDIO: RRR without murmur  GI: No visible scars, or masses. BS's present x4. No palpable masses or hepatosplenomegaly.  Non tender.  No guarding or rebound tenderness  EXTREMITIES: no cyanosis, clubbing or edema.  Homans NEG.  Dorsalis Pedis 2+.  LYMPH:  No lymphadenopathy.    NEURO: A&Ox3.  CN II-XII intact.  No focal deficits.  Coordination and Gait normal.  Kernig and Brudzinski's are negative.    Rapid COVID is NEGATIVE       ASSESSMENT AND PLAN:     ASSESSMENT:  Encounter Diagnosis   Name Primary?    Suspected COVID-19 virus infection Yes       PLAN:    Patient Instructions   Claritin D  Saline rinse  Follow up with PCP   If worse seek treatment             crepitations, rubs or wheezes.  Cardiovascular: Regular rate and rhythm. Normal first and second heart sounds with no gallops. No murmer or rubs. Peripheral pulses are 2+ palpable in all extremities. No lower extremity or dependant edema.  Abdomen: No surgical scars, distension, visible veins or hyperactive peristalsis. Normoactive bowel sounds with no abdominal bruit. Soft abdomen with no tenderness, rebound or guarding. No hepatosplenomegaly or masses. Intact hernial orifices. Rectal exam deferred.  Musculoskeletal: No joint or extremity swelling, erythema, sinuses or discharge. No tenderness or warmth to palpation, and no restriction in the range of movement.  Skin: No rashes or lesions. No pressure ulcers noted. Normal texture and temperature.  Neurological exam: Awake, alert and oriented in time, place and person. Appropriate recent and remote memory. Good judgment and reasoning. Cranial nerves II -XII grossly intact. Strength 5/5and sensations are intact in all extremities. Good coordination by finger-to-nose and heel-to-shin tests. Negative Romberg sign and normal gait.      Labs:  Recent Labs   Lab 04/14/22  1409   SODIUM 140   POTASSIUM 3.9   CHLORIDE 104   CO2 27   BUN 17   CREATININE 1.12   GLUCOSE 91   ALBUMIN 3.3*   *   BILIRUBIN 0.3     Recent Labs   Lab 04/14/22  1509 04/14/22  1409   WBC  --  3.8*   HGB  --  13.0   HCT  --  39.3   PLT  --  171   MCV  --  87.1   INR 1.1  --        Recent Labs   Lab 04/14/22  1409   CHOLESTEROL 173   HDL 68   CALCLDL 79   TRIGLYCERIDE 132       Imaging:  Personally reviewed  XR Chest PA and Lateral   Final Result   IMPRESSION:        No active disease in the chest.                 Results for orders placed during the hospital encounter of 02/11/17    XR Chest AP or PA    Narrative  EXAM: XR CHEST AP OR PA    CLINICAL HISTORY: Shortness of breath.    TECHNIQUE: Single view chest.    COMPARISON: July 20, 2009.    FINDINGS: The heart is normal in size. Pulmonary  vessels are normal. Lungs  are clear. There is no effusion.    Impression  No acute cardiopulmonary disease.        EKG:  Personally reviewed       Results for orders placed or performed during the hospital encounter of 06/02/17   ECG   Result Value Ref Range    Ventricular Rate EKG/Min (BPM) 98     Atrial Rate (BPM) 98     NM-Interval (MSEC) 148     QRS-Interval (MSEC) 78     QT-Interval (MSEC) 330     QTc 421     P Axis (Degrees) 76     R Axis (Degrees) 64     T Axis (Degrees) 70     REPORT TEXT       .  Normal sinus rhythm  Possible  Left atrial enlargement  Borderline ECG  When compared with ECG of  11-FEB-2017 06:25,  No significant change was found  Confirmed by DARIAN CALABRESE, MARIEL ([id number]),  Daria Yang ([id number]) on 6/5/2017 5:33:40 PM           Assessment and Plan:    Chest pain  NSTEMI  Will need to rule out ACS, but he is also positive for THC and cocaine.  Cardiology consulted by ED, started heparin drip and received plavix loading, will monitor on tele, trend troponin, will hold off on stress test until trops peak as he might need to go for cath.    Hypertension  Continue home medications.    Prophylaxis  Heparin drip and mechanical prophylaxis.    CODE STATUS  Full code.    I have discussed my assessment with the patient who expressed understanding and was in agreement with my management plan. The patient is expected to require more than two midnight stays.      Glenn Rangel MD  [facility] Hospitalist  4/14/2022  6:19 PM

## 2024-09-13 ENCOUNTER — TELEPHONE (OUTPATIENT)
Facility: CLINIC | Age: 54
End: 2024-09-13

## 2024-09-13 NOTE — TELEPHONE ENCOUNTER
----- Message from Sherly RICO sent at 12/5/2023  4:51 PM CST -----  Regarding: RE: After HST, new device, then 31-90  Pt saw ENT, considering surgical intervention to help improve cpap use.    ----- Message -----  From: Sherly Sun  Sent: 10/11/2023  10:22 AM CST  To: Sherly Sun  Subject: After HST, new device, then 31-90                Repeat HST to be ordered ASAP as the [facility] Records is JUVE and no longer worth the effort per pt's needs.

## 2024-09-17 ENCOUNTER — OFFICE VISIT (OUTPATIENT)
Facility: LOCATION | Age: 54
End: 2024-09-17

## 2024-09-17 DIAGNOSIS — J34.2 DEVIATED NASAL SEPTUM: ICD-10-CM

## 2024-09-17 DIAGNOSIS — R09.81 NASAL CONGESTION: Primary | ICD-10-CM

## 2024-09-17 DIAGNOSIS — M95.0 NASAL VALVE COLLAPSE: ICD-10-CM

## 2024-09-17 DIAGNOSIS — J34.3 HYPERTROPHY OF NASAL TURBINATES: ICD-10-CM

## 2024-09-17 PROCEDURE — 99024 POSTOP FOLLOW-UP VISIT: CPT | Performed by: STUDENT IN AN ORGANIZED HEALTH CARE EDUCATION/TRAINING PROGRAM

## 2024-09-17 PROCEDURE — 31231 NASAL ENDOSCOPY DX: CPT | Performed by: STUDENT IN AN ORGANIZED HEALTH CARE EDUCATION/TRAINING PROGRAM

## 2024-09-17 RX ORDER — AZELASTINE HYDROCHLORIDE, FLUTICASONE PROPIONATE 137; 50 UG/1; UG/1
2 SPRAY, METERED NASAL 2 TIMES DAILY
Qty: 23 G | Refills: 0 | Status: SHIPPED | OUTPATIENT
Start: 2024-09-17 | End: 2024-09-23

## 2024-09-17 RX ORDER — PREDNISONE 5 MG/1
TABLET ORAL
Qty: 35 TABLET | Refills: 0 | Status: SHIPPED | OUTPATIENT
Start: 2024-09-17 | End: 2024-10-01

## 2024-09-17 NOTE — PROGRESS NOTES
[facility]  OTOLARYNGOLOGY - HEAD & NECK SURGERY    9/17/2024     Reason for Consultation:   Nasal congestion, nasal valve collapse    History of Present Illness:   Patient is a pleasant 53 year old male who is being seen for chronic nasal congestion which first began after having trauma to his nose many years ago.  Recently he was seen by Dr. Galvan and had surgery in December for bilateral Latera implant placement, septoplasty, and bilateral inferior turbinate Coblation.  The patient states that he does not feel much better after the surgery.  He is also concerned about a septal perforation that was seen postoperatively on exam.  He does not have any significant nasal crusting or dryness.  He is here for second opinion and potential further surgery for his problem.  Patient states that when he lifts his cheek on both sides or when he lifts the tip of his nose he is able to breathe much much better.  He has been on multiple nasal sprays in the past including Flonase and azelastine.  He states that the nasal sprays did not help him breathe any better.    INTERVAL HISTORY  5/28/2024: Patient had cold the day of surgery and had to cancel. States his breathing is still poor through his nose. States he feels better when he holds the tip of his nose up.  7/2/2024: Patient returns today after not being able to see Dr. Walker due to insurance purposes. He would like to proceed with open functional rhinoplasty with cadaveric irradiated rib cartilage.  8/23/2024: Patient is 1 week postop from open rhinoplasty approach with placement of  grafts, and columellar strut using cadaveric cartilage.  He also underwent endoscopic septal perforation repair, endoscopic revision septoplasty, and bilateral inferior turbinate submucosal resection.  He is here for splint removal, external cast removal, suture removal, and debridement.  8/30/2024: Patient is 2 weeks postop today. Has been having crusting and congestion bilaterally.  Some swelling to the left nose.  9/17/2024: Patient is here for another follow up. He had a cold last week which caused him to have congestion. He has also been having itchy skin.    Past Medical History  Past Medical History:    Anxiety state    Asthma (HCC)    Depression    Esophageal reflux    Sleep apnea    CPAP       Past Surgical History  Past Surgical History:   Procedure Laterality Date    Other surgical history Bilateral     nasal septoplasty    Repair ing hernia,4+y/o,reducibl  2001    Sinus surgery        Open functional rhinoplasty, endoscopic septal perforation repair, bilateral inferior turbinate submucosal resection       Family History  Family History   Problem Relation Age of Onset    Diabetes Father     Heart Surgery Father         Artery repair     Diabetes Mother        Social History  Pediatric History   Patient Parents    Not on file     Other Topics Concern    Not on file   Social History Narrative    Not on file           Current Medications:  Current Outpatient Medications   Medication Sig Dispense Refill    hydrOXYzine 25 MG Oral Tab Take 1 tablet (25 mg total) by mouth every 6 (six) hours as needed for Itching. 20 tablet 0    Hypertonic Nasal Wash (SINUS RINSE BOTTLE KIT) Nasal Powd Pack Please use the Neilmed Sinus Rinse Squeeze Bottle. Fill the bottle to the dotted line with distilled or appropriately filtered water (DO NOT USE TAP WATER). Add in 1 Neilmed saline powder pouch. Lean over a sink and tilt your head down. Irrigate each nasal cavity with half of the bottle. Do this 2 times a day until you are told to stop. 1 each 0    Hypertonic Nasal Wash (SINUS RINSE KIT) Nasal Powd Pack Please use the Neilmed Sinus Rinse Squeeze Bottle. Fill the bottle to the dotted line with distilled or appropriately filtered water (DO NOT USE TAP WATER). Add in 1 Neilmed saline powder pouch. Lean over a sink and tilt your head down. Irrigate each nasal cavity with half of the bottle. Do this 2 times a  day until you are told to stop. 50 each 2    clobetasol 0.05 % External Cream Apply 1 Application topically 2 (two) times daily. 1 each 0    OMEPRAZOLE 40 MG Oral Capsule Delayed Release TAKE 1 CAPSULE(40 MG) BY MOUTH DAILY (Patient taking differently: Take 1 capsule (40 mg total) by mouth daily. Pt takes every other day) 90 capsule 0    albuterol 108 (90 Base) MCG/ACT Inhalation Aero Soln Inhale 2 puffs into the lungs every 6 (six) hours as needed for Wheezing or Shortness of Breath. 1 each 11    clotrimazole-betamethasone 1-0.05 % External Cream Apply 1 Application topically 2 (two) times daily as needed. 60 g 3    Sildenafil Citrate 50 MG Oral Tab Take 1 tablet (50 mg total) by mouth daily as needed for Erectile Dysfunction. 30 tablet 0    acetaminophen 325 MG Oral Tab Take 1 tablet (325 mg total) by mouth every 6 (six) hours as needed for Pain.         Allergies  Allergies   Allergen Reactions    Penicillin G HIVES     Denies skin peeling/blister, organ damage/failure         Review of Systems:   A comprehensive 10 point review of systems was completed.  Pertinent positives and negatives noted in the the HPI.    Physical Exam:   There were no vitals taken for this visit.    GENERAL: No acute distress, Comfortable appearing  FACE: HB 1/6, Normal Animation  HEAD: Normocephalic  EYES: EOMI, pupils equil  EARS: Bilateral Auricles Symmetric  NOSE: Nares patent bilaterally, the tip still has some edema, incision appears clean and dry and is healing well.  ORAL CAVITY: Tongue mobile, Oropharynx clear, Floor of mouth clear, Posterior oropharynx normal  NECK: No palpable lymphadenopathy, thyroid not palpable, nontender    PROCEDURE: BILATERAL RIGID NASAL ENDOSCOPY  Bilateral rigid nasal endoscopy (04504) was performed after nasal surgery in order to prevent infection, promote healing, and improve the nasal airway. Verbal consent was obtained from the patient to proceed with rigid nasal endoscopy. The nasal cavity was  decongested and topically anesthetized with a combination of Oxymetazoline and 4% Lidocaine. A rigid 4mm 30 degree nasal endoscope connected to a high-definition video endoscopy system was used to examine both nasal cavities. The inferior meatus, inferior turbinate, nasopharynx, middle meatus, middle turbinate, superior meatus, superior turbinate, and sphenoethmoidal recess were examined bilaterally, with any exceptions as noted below. At the completion of the procedure the endoscope was removed. The patient tolerated the procedure well. There were no complications.    Findings: Crusting, old clots, and necrotic tissue were removed endoscopically using a combination of Blakesley Forceps and Straight Suction to prevent infection, promote healing, and improve the nasal airway. The bilateral inferior turbinates were reduced and healing well. The Septum was midline with well healing perforation repair. The middle meatus was patent bilaterally. There were no obvious masses or polyps noted.    Results:     Laboratory Data:  Lab Results   Component Value Date    WBC 7.4 07/26/2024    HGB 14.9 07/26/2024    HCT 46.4 07/26/2024    .0 07/26/2024    CREATSERUM 0.88 07/26/2024    BUN 18 07/26/2024     07/26/2024    K 4.4 07/26/2024     07/26/2024    CO2 27.0 07/26/2024    GLU 98 07/26/2024    CA 9.2 07/26/2024    ALB 4.4 07/26/2024    ALKPHO 59 07/26/2024    TP 7.2 07/26/2024    AST 25 07/26/2024    ALT 16 07/26/2024    TSH 1.817 07/26/2024    LIP 84 01/05/2023         Imaging:  No results found.      Impression:   Bilateral nasal valve collapse  Deviated nasal septum  Bilateral inferior turbinate hypertrophy  Nasal congestion  Nasal obstruction    Recommendations:  Start dymista nasal spray BID  Prednisone taper for recent cold and nasal congestion  Follow-up in 4 weeks    Ladnry Dixon DO   Otolaryngology/Rhinology, Sinus, and Endoscopic Skull Base Surgery  [place] [address] [facility]  [place] [address] [place] [id number]  Phone [phone]  Fax [phone]  3/11/2024  5:40 PM  9/17/2024

## 2024-09-17 NOTE — ADDENDUM NOTE
Addended by: CHELE SWAN on: 9/17/2024 04:59 PM     Modules accepted: Level of Service     PT was evaluated At [facility] ED and was found to have a condition that warranted time of to rest and heal from WORK/SCHOOL.   Elliot Luna PA-C PT was evaluated At [facility] ED and was found to have a condition that was corrected Pt cleared by Cardiology for out pt follow up. Pt cleared by ED staff to return to addiction treatment center with no restrictions to activity. Please reach out to us at [phone] with any more questions.   Elliot Luna PA-C

## 2024-09-19 ENCOUNTER — PATIENT MESSAGE (OUTPATIENT)
Facility: LOCATION | Age: 54
End: 2024-09-19

## 2024-09-20 ENCOUNTER — TELEPHONE (OUTPATIENT)
Dept: OTOLARYNGOLOGY | Facility: CLINIC | Age: 54
End: 2024-09-20

## 2024-09-20 NOTE — TELEPHONE ENCOUNTER
Please see [employer] message from 9/19. Insurance does not cover nasal spray, was told alternative would be prescribed if this happened. Please advise thank you.

## 2024-09-23 RX ORDER — FLUNISOLIDE 0.25 MG/ML
2 SOLUTION NASAL 2 TIMES DAILY
Qty: 25 ML | Refills: 0 | Status: SHIPPED | OUTPATIENT
Start: 2024-09-23

## 2024-09-23 NOTE — TELEPHONE ENCOUNTER
From: Vik Frey  To: Landry Dixon  Sent: 9/19/2024 12:31 PM CDT  Subject: Insurance issue for nasal spray    Hello,     My insurance does not cover the fluticasone nasal spray Dr. Dixon prescribed me. Is there any other spray that could be covered by the insurance?     Thank you in advanced

## 2024-09-23 NOTE — TELEPHONE ENCOUNTER
Patient was prescribe dymista- not covered and requesting and alternative to be prescribed. Thanks.

## 2024-09-23 NOTE — TELEPHONE ENCOUNTER
Called Griselda([facility]) on file and let her know that Dr. Dixon sent in separate prescriptions to pharmacy for the azelastine and fluticasone nasal sprays- [facility] in [place].

## 2024-09-23 NOTE — TELEPHONE ENCOUNTER
, please see mychart note. Pt already prescribed Astelin nasal spray which is covered but not Flonase as OTC, any other substitute for Flonase ?

## 2024-09-24 ENCOUNTER — TELEPHONE (OUTPATIENT)
Dept: OTOLARYNGOLOGY | Facility: CLINIC | Age: 54
End: 2024-09-24

## 2024-09-24 NOTE — TELEPHONE ENCOUNTER
Refill request for Flunisolide, Dr. Dixon switched him to this spray per insurance, called and left a VM with the [facility] with the new prescription, not sure why patient is asking for a refill.

## 2024-09-24 NOTE — TELEPHONE ENCOUNTER
Current Outpatient Medications:     flunisolide 25 MCG/ACT (0.025%) Nasal Solution, 2 sprays by Each Nare route 2 (two) times daily., Disp: 25 mL, Rfl: 0

## 2024-09-25 ENCOUNTER — APPOINTMENT (OUTPATIENT)
Dept: ULTRASOUND IMAGING | Age: 54
End: 2024-09-25
Attending: NURSE PRACTITIONER
Payer: COMMERCIAL

## 2024-09-25 ENCOUNTER — HOSPITAL ENCOUNTER (OUTPATIENT)
Age: 54
Discharge: HOME | End: 2024-09-25
Payer: COMMERCIAL

## 2024-09-25 VITALS
HEIGHT: 68 IN | OXYGEN SATURATION: 98 % | RESPIRATION RATE: 16 BRPM | TEMPERATURE: 99 F | BODY MASS INDEX: 31.37 KG/M2 | WEIGHT: 207 LBS | SYSTOLIC BLOOD PRESSURE: 120 MMHG | HEART RATE: 103 BPM | DIASTOLIC BLOOD PRESSURE: 75 MMHG

## 2024-09-25 DIAGNOSIS — R10.9 ABDOMINAL PAIN, ACUTE: Primary | ICD-10-CM

## 2024-09-25 DIAGNOSIS — R19.7 NAUSEA VOMITING AND DIARRHEA: ICD-10-CM

## 2024-09-25 DIAGNOSIS — R11.2 NAUSEA VOMITING AND DIARRHEA: ICD-10-CM

## 2024-09-25 LAB
#MXD IC: 0.7 X10ˆ3/UL (ref 0.1–1)
BILIRUB UR QL STRIP: NEGATIVE
BUN BLD-MCNC: 24 MG/DL (ref 7–18)
CHLORIDE BLD-SCNC: 100 MMOL/L (ref 98–112)
CLARITY UR: CLEAR
CO2 BLD-SCNC: 28 MMOL/L (ref 21–32)
CREAT BLD-MCNC: 1 MG/DL
EGFRCR SERPLBLD CKD-EPI 2021: 89 ML/MIN/1.73M2 (ref 60–?)
GLUCOSE BLD-MCNC: 126 MG/DL (ref 70–99)
GLUCOSE UR STRIP-MCNC: NEGATIVE MG/DL
HCT VFR BLD AUTO: 48.1 %
HCT VFR BLD CALC: 50 %
HGB BLD-MCNC: 15.5 G/DL
HGB UR QL STRIP: NEGATIVE
ISTAT IONIZED CALCIUM FOR CHEM 8: 1.11 MMOL/L (ref 1.12–1.32)
KETONES UR STRIP-MCNC: NEGATIVE MG/DL
LEUKOCYTE ESTERASE UR QL STRIP: NEGATIVE
LYMPHOCYTES # BLD AUTO: 0.7 X10ˆ3/UL (ref 1–4)
LYMPHOCYTES NFR BLD AUTO: 6.6 %
MCH RBC QN AUTO: 26.6 PG (ref 26–34)
MCHC RBC AUTO-ENTMCNC: 32.2 G/DL (ref 31–37)
MCV RBC AUTO: 82.5 FL (ref 80–100)
MIXED CELL %: 7.4 %
NEUTROPHILS # BLD AUTO: 8.7 X10ˆ3/UL (ref 1.5–7.7)
NEUTROPHILS NFR BLD AUTO: 86 %
NITRITE UR QL STRIP: NEGATIVE
PH UR STRIP: 6 [PH]
PLATELET # BLD AUTO: 227 X10ˆ3/UL (ref 150–450)
POTASSIUM BLD-SCNC: 4.1 MMOL/L (ref 3.6–5.1)
RBC # BLD AUTO: 5.83 X10ˆ6/UL
SODIUM BLD-SCNC: 138 MMOL/L (ref 136–145)
SP GR UR STRIP: >=1.03
UROBILINOGEN UR STRIP-ACNC: <2 MG/DL
WBC # BLD AUTO: 10.1 X10ˆ3/UL (ref 4–11)

## 2024-09-25 PROCEDURE — 96374 THER/PROPH/DIAG INJ IV PUSH: CPT

## 2024-09-25 PROCEDURE — S0028 INJECTION, FAMOTIDINE, 20 MG: HCPCS

## 2024-09-25 PROCEDURE — 99214 OFFICE O/P EST MOD 30 MIN: CPT

## 2024-09-25 PROCEDURE — 85025 COMPLETE CBC W/AUTO DIFF WBC: CPT | Performed by: NURSE PRACTITIONER

## 2024-09-25 PROCEDURE — 96361 HYDRATE IV INFUSION ADD-ON: CPT

## 2024-09-25 PROCEDURE — 81002 URINALYSIS NONAUTO W/O SCOPE: CPT

## 2024-09-25 PROCEDURE — 80047 BASIC METABLC PNL IONIZED CA: CPT

## 2024-09-25 PROCEDURE — 96375 TX/PRO/DX INJ NEW DRUG ADDON: CPT

## 2024-09-25 PROCEDURE — 76700 US EXAM ABDOM COMPLETE: CPT | Performed by: NURSE PRACTITIONER

## 2024-09-25 RX ORDER — FAMOTIDINE 10 MG/ML
20 INJECTION, SOLUTION INTRAVENOUS ONCE
Status: COMPLETED | OUTPATIENT
Start: 2024-09-25 | End: 2024-09-25

## 2024-09-25 RX ORDER — ONDANSETRON 2 MG/ML
4 INJECTION INTRAMUSCULAR; INTRAVENOUS ONCE
Status: COMPLETED | OUTPATIENT
Start: 2024-09-25 | End: 2024-09-25

## 2024-09-25 RX ORDER — SODIUM CHLORIDE 9 MG/ML
1000 INJECTION, SOLUTION INTRAVENOUS ONCE
Status: COMPLETED | OUTPATIENT
Start: 2024-09-25 | End: 2024-09-25

## 2024-09-25 NOTE — ED PROVIDER NOTES
Patient Seen in: [facility]      History     Chief Complaint   Patient presents with    Abdomen/Flank Pain     Stated Complaint: stomach issues    Subjective:   HPI  53-year-old with anxiety, asthma, depression, GERD, and sleep apnea presents complaining of epigastric abdominal pain accompanied with nausea, vomiting, and diarrhea that started yesterday.  He has had a hernia repair before.  He denies any fever but had chills.  He denies any black or bloody stools. He denies any alcohol usage.    Objective:   Past Medical History:    Anxiety state    Asthma (HCC)    Depression    Esophageal reflux    Sleep apnea    CPAP              Past Surgical History:   Procedure Laterality Date    Other surgical history Bilateral     nasal septoplasty    Repair ing hernia,4+y/o,reducibl  2001    Sinus surgery        Open functional rhinoplasty, endoscopic septal perforation repair, bilateral inferior turbinate submucosal resection                Social History     Socioeconomic History    Marital status:    Tobacco Use    Smoking status: Former     Types: Cigarettes    Smokeless tobacco: Never   Vaping Use    Vaping status: Never Used   Substance and Sexual Activity    Alcohol use: Not Currently     Comment: ocassionally    Drug use: Never              Review of Systems   All other systems reviewed and are negative.      Positive for stated Chief Complaint: Abdomen/Flank Pain    Other systems are as noted in HPI.  Constitutional and vital signs reviewed.      All other systems reviewed and negative except as noted above.    Physical Exam     ED Triage Vitals [09/25/24 1609]   /75   Pulse 103   Resp 16   Temp 98.5 °F (36.9 °C)   Temp src Oral   SpO2 98 %   O2 Device None (Room air)       Current Vitals:   Vital Signs  BP: 120/75  Pulse: 103  Resp: 16  Temp: 98.5 °F (36.9 °C)  Temp src: Oral    Oxygen Therapy  SpO2: 98 %  O2 Device: None (Room air)            Physical Exam  Vitals and nursing note  reviewed.   Constitutional:       General: He is not in acute distress.     Appearance: He is well-developed. He is not ill-appearing or toxic-appearing.   Eyes:      General: No scleral icterus.  Cardiovascular:      Rate and Rhythm: Normal rate and regular rhythm.      Heart sounds: Normal heart sounds.   Pulmonary:      Effort: Pulmonary effort is normal.      Breath sounds: Normal breath sounds.   Abdominal:      General: Abdomen is flat. Bowel sounds are normal. There is no distension.      Palpations: Abdomen is soft.      Tenderness: There is abdominal tenderness in the epigastric area.   Skin:     General: Skin is warm and dry.   Neurological:      Mental Status: He is alert and oriented to person, place, and time.             ED Course     Labs Reviewed   POCT CBC - Abnormal; Notable for the following components:       Result Value    RBC IC 5.83 (*)     # Neutrophil 8.7 (*)     # Lymphocyte 0.7 (*)     All other components within normal limits   [facility] POCT URINALYSIS DIPSTICK - Abnormal; Notable for the following components:    Protein urine Trace (*)     All other components within normal limits   POCT ISTAT CHEM8 CARTRIDGE - Abnormal; Notable for the following components:    ISTAT BUN 24 (*)     ISTAT Ionized Calcium 1.11 (*)     ISTAT Glucose 126 (*)     All other components within normal limits             US ABDOMEN COMPLETE (CPT=76700)    Result Date: 9/25/2024  PROCEDURE:  US ABDOMEN COMPLETE (CPT=76700)  COMPARISON:  None.  INDICATIONS:  stomach issues epigastric pain  TECHNIQUE:  Real time gray-scale ultrasound was used to evaluate the abdomen.  The exam includes images of the liver, gallbladder, common bile duct, pancreas, spleen, kidneys, IVC, and aorta.  PATIENT STATED HISTORY: (As transcribed by Technologist)  Patient states he has epigastric pain radiating to his right flank and to his back, vomiting since yesterday.    FINDINGS:  LIVER:  Normal size and increased echogenicity fatty liver. No  significant masses.  GALLBLADDER:  Gallbladder sludge.  Gallbladder wall thickened 5-6 mm maximum.  No  Rouse sign.  Mildly dilated gallbladder.  No pericholecystic fluid.  BILE DUCTS:  Common bile duct diameter is 5.0 mm.  No sign of intrahepatic biliary ductal dilation.  PANCREAS:  Partially obscured, no abnormalities are seen  SPLEEN:  Normal.  KIDNEYS:  Normal.  Right kidney measures 12.1 cm.  Left kidney measures 12.6 cm.  AORTA/IVC:  Normal.              CONCLUSION:  Dilated gallbladder with wall thickening, and sludge.  Although there is no Rouse sign or surrounding fluid, and the common bile duct is normal caliber, follow-up for any potential signs or symptoms of developing cholecystitis.  Consider HIDA scan if clinically indicated.  No ascites.  Fatty liver changes are seen.   LOCATION:  [facility]    Dictated by (CST): Billy Paez MD on 9/25/2024 at 5:50 PM     Finalized by (CST): Billy Paez MD on 9/25/2024 at 5:53 PM             MDM     Medical Decision Making  53-year-old with anxiety, asthma, depression, GERD, and sleep apnea presents complaining of epigastric abdominal pain accompanied with nausea, vomiting, and diarrhea that started yesterday.  He has had a hernia repair before.  He denies any fever but had chills.  He denies any black or bloody stools. He denies any alcohol usage.    Pertinent Labs & Imaging studies reviewed. (See chart for details).  Patient coming in with abdominal pain, n/v/d.   Differential diagnosis includes but not limited to abd pain, n/v/d, cholecystitis, infectious diarrhea  Labs reviewed CBC and chem wnl. Urine without infection. Radiology Dilated gallbladder with wall thickening, and sludge.  Although there is no Rouse sign or surrounding fluid, and the common bile duct is normal caliber, follow-up for any potential signs or symptoms of developing cholecystitis.  Consider HIDA scan if clinically indicated.  No ascites.  Fatty liver changes are seen.  Will treat for  abdominal pain, nausea vomiting diarrhea.  Will discharge on follow-up with surgery. Patient/Parent is comfortable with this plan.    Patient was given Zofran, Pepcid, and a liter saline.  On repeat exam patient without any abdominal tenderness and states he feels much improved.  On initial exam I discussed patient going to the emergency room which was declined.  On repeat exam I discussed with patient going to the emergency room for LFTs, lipase, and possible surgical consult due to the ultrasound findings.  All information was relayed through  and patient declined going to the emergency room.  He understands that he could have an infection and this could cause him to become very ill.  He states that at this time he would like to go home and that he will follow-up as an outpatient with surgery.  I explained at any time if his symptoms get worse he should go to the emergency department.  Patient is alert and oriented x 4 and able to make his own decisions.  Exam was done of his abdomen again and he currently has no abdominal tenderness.  He tolerated p.o. without any vomiting.      Problems Addressed:  Abdominal pain, acute: acute illness or injury  Nausea vomiting and diarrhea: acute illness or injury        Disposition and Plan     Clinical Impression:  1. Abdominal pain, acute    2. Nausea vomiting and diarrhea         Disposition:  Discharge  9/25/2024  6:14 pm    Follow-up:  Tyrel Marte MD  [address] [facility] [address]  [phone]    Call             Medications Prescribed:  Discharge Medication List as of 9/25/2024  6:19 PM

## 2024-09-25 NOTE — DISCHARGE INSTRUCTIONS
You must closely follow up with surgery.  If you get any repeat pain or symptoms go straight to the ER.

## 2024-09-25 NOTE — ED INITIAL ASSESSMENT (HPI)
Since yesterday, c/o mid upper abdominal pain that radiates to the med-low back with nausea. Also has been vomiting x 4. States he ate tacos yesterday that may have caused the pain. Also has had a small amt of diarrhea. Used OTC \"stomach\" medication- unsure of name. Took Tyl at 0530 today.

## 2024-10-15 ENCOUNTER — OFFICE VISIT (OUTPATIENT)
Facility: LOCATION | Age: 54
End: 2024-10-15

## 2024-10-15 VITALS — BODY MASS INDEX: 31.37 KG/M2 | WEIGHT: 207 LBS | HEIGHT: 68 IN

## 2024-10-15 DIAGNOSIS — J34.3 HYPERTROPHY OF NASAL TURBINATES: ICD-10-CM

## 2024-10-15 DIAGNOSIS — J34.829 NASAL VALVE COLLAPSE: ICD-10-CM

## 2024-10-15 DIAGNOSIS — J34.2 DEVIATED NASAL SEPTUM: ICD-10-CM

## 2024-10-15 DIAGNOSIS — J34.2 DEVIATED SEPTUM: ICD-10-CM

## 2024-10-15 DIAGNOSIS — R09.81 NASAL CONGESTION: Primary | ICD-10-CM

## 2024-10-15 PROCEDURE — 99024 POSTOP FOLLOW-UP VISIT: CPT | Performed by: STUDENT IN AN ORGANIZED HEALTH CARE EDUCATION/TRAINING PROGRAM

## 2024-10-15 PROCEDURE — 3008F BODY MASS INDEX DOCD: CPT | Performed by: STUDENT IN AN ORGANIZED HEALTH CARE EDUCATION/TRAINING PROGRAM

## 2024-10-15 PROCEDURE — 31231 NASAL ENDOSCOPY DX: CPT | Performed by: STUDENT IN AN ORGANIZED HEALTH CARE EDUCATION/TRAINING PROGRAM

## 2024-10-15 NOTE — PROGRESS NOTES
[facility]  OTOLARYNGOLOGY - HEAD & NECK SURGERY    10/15/2024     Reason for Consultation:   Nasal congestion, nasal valve collapse    History of Present Illness:   Patient is a pleasant 53 year old male who is being seen for chronic nasal congestion which first began after having trauma to his nose many years ago.  Recently he was seen by Dr. Galvan and had surgery in December for bilateral Latera implant placement, septoplasty, and bilateral inferior turbinate Coblation.  The patient states that he does not feel much better after the surgery.  He is also concerned about a septal perforation that was seen postoperatively on exam.  He does not have any significant nasal crusting or dryness.  He is here for second opinion and potential further surgery for his problem.  Patient states that when he lifts his cheek on both sides or when he lifts the tip of his nose he is able to breathe much much better.  He has been on multiple nasal sprays in the past including Flonase and azelastine.  He states that the nasal sprays did not help him breathe any better.    INTERVAL HISTORY  5/28/2024: Patient had cold the day of surgery and had to cancel. States his breathing is still poor through his nose. States he feels better when he holds the tip of his nose up.  7/2/2024: Patient returns today after not being able to see Dr. Walker due to insurance purposes. He would like to proceed with open functional rhinoplasty with cadaveric irradiated rib cartilage.  8/23/2024: Patient is 1 week postop from open rhinoplasty approach with placement of  grafts, and columellar strut using cadaveric cartilage.  He also underwent endoscopic septal perforation repair, endoscopic revision septoplasty, and bilateral inferior turbinate submucosal resection.  He is here for splint removal, external cast removal, suture removal, and debridement.  8/30/2024: Patient is 2 weeks postop today. Has been having crusting and congestion bilaterally.  Some swelling to the left nose.  9/17/2024: Patient is here for another follow up. He had a cold last week which caused him to have congestion. He has also been having itchy skin.    Past Medical History  Past Medical History:    Anxiety state    Asthma (HCC)    Depression    Esophageal reflux    Sleep apnea    CPAP       Past Surgical History  Past Surgical History:   Procedure Laterality Date    Other surgical history Bilateral     nasal septoplasty    Repair ing hernia,4+y/o,reducibl  2001    Sinus surgery        Open functional rhinoplasty, endoscopic septal perforation repair, bilateral inferior turbinate submucosal resection       Family History  Family History   Problem Relation Age of Onset    Diabetes Father     Heart Surgery Father         Artery repair     Diabetes Mother        Social History  Pediatric History   Patient Parents    Not on file     Other Topics Concern    Not on file   Social History Narrative    Not on file           Current Medications:  Current Outpatient Medications   Medication Sig Dispense Refill    azelastine 0.1 % Nasal Solution 2 sprays by Nasal route 2 (two) times daily. 30 mL 3    flunisolide 25 MCG/ACT (0.025%) Nasal Solution 2 sprays by Each Nare route 2 (two) times daily. 25 mL 0    Hypertonic Nasal Wash (SINUS RINSE BOTTLE KIT) Nasal Powd Pack Please use the [employer] Sinus Rinse Squeeze Bottle. Fill the bottle to the dotted line with distilled or appropriately filtered water (DO NOT USE TAP WATER). Add in 1 [employer] saline powder pouch. Lean over a sink and tilt your head down. Irrigate each nasal cavity with half of the bottle. Do this 2 times a day until you are told to stop. 1 each 0    Hypertonic Nasal Wash (SINUS RINSE KIT) Nasal Powd Pack Please use the [employer] Sinus Rinse Squeeze Bottle. Fill the bottle to the dotted line with distilled or appropriately filtered water (DO NOT USE TAP WATER). Add in 1 [employer] saline powder pouch. Lean over a sink and tilt your head  down. Irrigate each nasal cavity with half of the bottle. Do this 2 times a day until you are told to stop. 50 each 2    clobetasol 0.05 % External Cream Apply 1 Application topically 2 (two) times daily. 1 each 0    OMEPRAZOLE 40 MG Oral Capsule Delayed Release TAKE 1 CAPSULE(40 MG) BY MOUTH DAILY (Patient taking differently: Take 1 capsule (40 mg total) by mouth daily. Pt takes every other day) 90 capsule 0    albuterol 108 (90 Base) MCG/ACT Inhalation Aero Soln Inhale 2 puffs into the lungs every 6 (six) hours as needed for Wheezing or Shortness of Breath. 1 each 11    clotrimazole-betamethasone 1-0.05 % External Cream Apply 1 Application topically 2 (two) times daily as needed. 60 g 3    Sildenafil Citrate 50 MG Oral Tab Take 1 tablet (50 mg total) by mouth daily as needed for Erectile Dysfunction. 30 tablet 0    acetaminophen 325 MG Oral Tab Take 1 tablet (325 mg total) by mouth every 6 (six) hours as needed for Pain.         Allergies  Allergies   Allergen Reactions    Penicillin G HIVES     Denies skin peeling/blister, organ damage/failure         Review of Systems:   A comprehensive 10 point review of systems was completed.  Pertinent positives and negatives noted in the the HPI.    Physical Exam:   Height 5' 8\" (1.727 m), weight 207 lb (93.9 kg).    GENERAL: No acute distress, Comfortable appearing  FACE: HB 1/6, Normal Animation  HEAD: Normocephalic  EYES: EOMI, pupils equil  EARS: Bilateral Auricles Symmetric  NOSE: Nares patent bilaterally, the tip still has some edema, as well as some fullness in the left scroll area  ORAL CAVITY: Tongue mobile, Oropharynx clear, Floor of mouth clear, Posterior oropharynx normal  NECK: No palpable lymphadenopathy, thyroid not palpable, nontender    PROCEDURE: BILATERAL RIGID NASAL ENDOSCOPY  Bilateral rigid nasal endoscopy (50609) was performed. Verbal consent was obtained from the patient to proceed with rigid nasal endoscopy. A rigid 4mm 30 degree nasal endoscope  connected to a high-definition endoscopy system was used to examine both nasal cavities. The inferior meatus, inferior turbinate, nasopharynx, middle meatus, middle turbinate, superior meatus, superior turbinate, and sphenoethmoidal recess were examined bilaterally and deemed to be normal, with any exceptions as noted below. At the completion of the procedure the endoscope was removed. The patient tolerated the procedure well. There were no complications.    Findings: The bilateral inferior turbinates were reduced and healing well. The Septum was midline with septal perforation repair well healed. The middle meatus was patent bilaterally without any pus drainage. There were no obvious masses or polyps noted.    Results:     Laboratory Data:  Lab Results   Component Value Date    WBC 7.4 07/26/2024    HGB 14.9 07/26/2024    HCT 46.4 07/26/2024    .0 07/26/2024    CREATSERUM 0.88 07/26/2024    BUN 18 07/26/2024     07/26/2024    K 4.4 07/26/2024     07/26/2024    CO2 27.0 07/26/2024    GLU 98 07/26/2024    CA 9.2 07/26/2024    ALB 4.4 07/26/2024    ALKPHO 59 07/26/2024    TP 7.2 07/26/2024    AST 25 07/26/2024    ALT 16 07/26/2024    TSH 1.817 07/26/2024    LIP 84 01/05/2023         Imaging:  No results found.      Impression:   Bilateral nasal valve collapse  Deviated nasal septum  Bilateral inferior turbinate hypertrophy  Nasal congestion  Nasal obstruction    Recommendations:  Patient is breathing better  I will have him continue nasal sprays  Follow-up in 3 months     Landry Dixon DO   Otolaryngology/Rhinology, Sinus, and Endoscopic Skull Base Surgery  [address] [place] [address]  Phone [phone]  Fax [phone]  3/11/2024  5:40 PM  10/15/2024

## 2025-01-15 ENCOUNTER — OFFICE VISIT (OUTPATIENT)
Facility: LOCATION | Age: 55
End: 2025-01-15

## 2025-01-15 DIAGNOSIS — J34.829 NASAL VALVE COLLAPSE: ICD-10-CM

## 2025-01-15 DIAGNOSIS — J34.2 DEVIATED NASAL SEPTUM: ICD-10-CM

## 2025-01-15 DIAGNOSIS — R09.81 NASAL CONGESTION: Primary | ICD-10-CM

## 2025-01-15 DIAGNOSIS — J34.3 HYPERTROPHY OF NASAL TURBINATES: ICD-10-CM

## 2025-01-15 PROCEDURE — 31231 NASAL ENDOSCOPY DX: CPT | Performed by: STUDENT IN AN ORGANIZED HEALTH CARE EDUCATION/TRAINING PROGRAM

## 2025-01-15 PROCEDURE — 99213 OFFICE O/P EST LOW 20 MIN: CPT | Performed by: STUDENT IN AN ORGANIZED HEALTH CARE EDUCATION/TRAINING PROGRAM

## 2025-01-15 NOTE — PROGRESS NOTES
[facility]  OTOLARYNGOLOGY - HEAD & NECK SURGERY    1/15/2025     Reason for Consultation:   Nasal congestion, nasal valve collapse    History of Present Illness:   Patient is a pleasant 54 year old male who is being seen for chronic nasal congestion which first began after having trauma to his nose many years ago.  Recently he was seen by Dr. Galvan and had surgery in December for bilateral Latera implant placement, septoplasty, and bilateral inferior turbinate Coblation.  The patient states that he does not feel much better after the surgery.  He is also concerned about a septal perforation that was seen postoperatively on exam.  He does not have any significant nasal crusting or dryness.  He is here for second opinion and potential further surgery for his problem.  Patient states that when he lifts his cheek on both sides or when he lifts the tip of his nose he is able to breathe much much better.  He has been on multiple nasal sprays in the past including Flonase and azelastine.  He states that the nasal sprays did not help him breathe any better.    INTERVAL HISTORY  5/28/2024: Patient had cold the day of surgery and had to cancel. States his breathing is still poor through his nose. States he feels better when he holds the tip of his nose up.  7/2/2024: Patient returns today after not being able to see Dr. Walker due to insurance purposes. He would like to proceed with open functional rhinoplasty with cadaveric irradiated rib cartilage.  8/23/2024: Patient is 1 week postop from open rhinoplasty approach with placement of  grafts, and columellar strut using cadaveric cartilage.  He also underwent endoscopic septal perforation repair, endoscopic revision septoplasty, and bilateral inferior turbinate submucosal resection.  He is here for splint removal, external cast removal, suture removal, and debridement.  8/30/2024: Patient is 2 weeks postop today. Has been having crusting and congestion bilaterally.  Some swelling to the left nose.  9/17/2024: Patient is here for another follow up. He had a cold last week which caused him to have congestion. He has also been having itchy skin.  1/15/2025: Patient is here for another follow-up.  He has been slowly improving.  Continues to breathe well through his nose although he does not feel he is at 100%.  He is sleeping better and is breathing better at night, however when he is exerting himself he tends to need to breathe through his mouth.  He states that this is due to a sort of dynamic collapse of his nasal ala bilaterally.  He has not tried magnetic strips or Breathe Right strips in the past.    Past Medical History  Past Medical History:    Anxiety state    Asthma (HCC)    Depression    Esophageal reflux    Sleep apnea    CPAP       Past Surgical History  Past Surgical History:   Procedure Laterality Date    Other surgical history Bilateral     nasal septoplasty    Repair ing hernia,4+y/o,reducibl  2001    Sinus surgery        Open functional rhinoplasty, endoscopic septal perforation repair, bilateral inferior turbinate submucosal resection       Family History  Family History   Problem Relation Age of Onset    Diabetes Father     Heart Surgery Father         Artery repair     Diabetes Mother        Social History  Pediatric History   Patient Parents    Not on file     Other Topics Concern    Not on file   Social History Narrative    Not on file           Current Medications:  Current Outpatient Medications   Medication Sig Dispense Refill    azelastine 0.1 % Nasal Solution 2 sprays by Nasal route 2 (two) times daily. 30 mL 3    flunisolide 25 MCG/ACT (0.025%) Nasal Solution 2 sprays by Each Nare route 2 (two) times daily. 25 mL 0    Hypertonic Nasal Wash (SINUS RINSE BOTTLE KIT) Nasal Powd Pack Please use the [employer]med Sinus Rinse Squeeze Bottle. Fill the bottle to the dotted line with distilled or appropriately filtered water (DO NOT USE TAP WATER). Add in 1 [employer]med  saline powder pouch. Lean over a sink and tilt your head down. Irrigate each nasal cavity with half of the bottle. Do this 2 times a day until you are told to stop. 1 each 0    Hypertonic Nasal Wash (SINUS RINSE KIT) Nasal Powd Pack Please use the Neilmed Sinus Rinse Squeeze Bottle. Fill the bottle to the dotted line with distilled or appropriately filtered water (DO NOT USE TAP WATER). Add in 1 Neilmed saline powder pouch. Lean over a sink and tilt your head down. Irrigate each nasal cavity with half of the bottle. Do this 2 times a day until you are told to stop. 50 each 2    clobetasol 0.05 % External Cream Apply 1 Application topically 2 (two) times daily. 1 each 0    OMEPRAZOLE 40 MG Oral Capsule Delayed Release TAKE 1 CAPSULE(40 MG) BY MOUTH DAILY (Patient taking differently: Take 1 capsule (40 mg total) by mouth daily. Pt takes every other day) 90 capsule 0    albuterol 108 (90 Base) MCG/ACT Inhalation Aero Soln Inhale 2 puffs into the lungs every 6 (six) hours as needed for Wheezing or Shortness of Breath. 1 each 11    clotrimazole-betamethasone 1-0.05 % External Cream Apply 1 Application topically 2 (two) times daily as needed. 60 g 3    Sildenafil Citrate 50 MG Oral Tab Take 1 tablet (50 mg total) by mouth daily as needed for Erectile Dysfunction. 30 tablet 0    acetaminophen 325 MG Oral Tab Take 1 tablet (325 mg total) by mouth every 6 (six) hours as needed for Pain.         Allergies  Allergies   Allergen Reactions    Penicillin G HIVES     Denies skin peeling/blister, organ damage/failure         Review of Systems:   A comprehensive 10 point review of systems was completed.  Pertinent positives and negatives noted in the the HPI.    Physical Exam:   There were no vitals taken for this visit.    GENERAL: No acute distress, Comfortable appearing  FACE: HB 1/6, Normal Animation  HEAD: Normocephalic  EYES: EOMI, pupils equil  EARS: Bilateral Auricles Symmetric  NOSE: Nares patent bilaterally, tip edema has  gone down significantly  ORAL CAVITY: Tongue mobile, Oropharynx clear, Floor of mouth clear, Posterior oropharynx normal  NECK: No palpable lymphadenopathy, thyroid not palpable, nontender    PROCEDURE: BILATERAL RIGID NASAL ENDOSCOPY  Bilateral rigid nasal endoscopy (34030) was performed. Verbal consent was obtained from the patient to proceed with rigid nasal endoscopy. A rigid 4mm 30 degree nasal endoscope connected to a high-definition endoscopy system was used to examine both nasal cavities. The inferior meatus, inferior turbinate, nasopharynx, middle meatus, middle turbinate, superior meatus, superior turbinate, and sphenoethmoidal recess were examined bilaterally and deemed to be normal, with any exceptions as noted below. At the completion of the procedure the endoscope was removed. The patient tolerated the procedure well. There were no complications.    Findings: The bilateral inferior turbinates were reduced fully healed. The Septum was midline with septal perforation repair fully healed. The middle meatus was patent bilaterally without any pus drainage. There were no obvious masses or polyps noted.  There was some mild crusting anteriorly and some nasal dryness noted.    Results:     Laboratory Data:  Lab Results   Component Value Date    WBC 7.4 07/26/2024    HGB 14.9 07/26/2024    HCT 46.4 07/26/2024    .0 07/26/2024    CREATSERUM 0.88 07/26/2024    BUN 18 07/26/2024     07/26/2024    K 4.4 07/26/2024     07/26/2024    CO2 27.0 07/26/2024    GLU 98 07/26/2024    CA 9.2 07/26/2024    ALB 4.4 07/26/2024    ALKPHO 59 07/26/2024    TP 7.2 07/26/2024    AST 25 07/26/2024    ALT 16 07/26/2024    TSH 1.817 07/26/2024    LIP 84 01/05/2023         Imaging:  No results found.      Impression:       ICD-10-CM    1. Nasal congestion  R09.81       2. Nasal valve collapse  J34.829       3. Deviated nasal septum  J34.2       4. Hypertrophy of nasal turbinates  J34.3         Recommendations:  The  patient does have some dynamic collapse of the external nasal valve.  He will try magnetic nasal strips.  Overall he states that his breathing is significantly improved following surgery but does still have some mild congestion when breathing and rapidly through his nose.  We will continue to monitor and he will return to see me in 6 months for reevaluation.  He discussed that he would like to avoid any further surgery at this time and I agree that another surgery would likely not be beneficial to him.    Landry Dixon, DO   Otolaryngology/Rhinology, Sinus, and Endoscopic Skull Base Surgery  [facility] [address] [place] [address]  Phone [phone]  Fax [phone]  3/11/2024  5:40 PM  1/15/2025

## 2025-05-21 ENCOUNTER — OFFICE VISIT (OUTPATIENT)
Dept: FAMILY MEDICINE CLINIC | Facility: CLINIC | Age: 55
End: 2025-05-21
Payer: COMMERCIAL

## 2025-05-21 VITALS
OXYGEN SATURATION: 99 % | HEIGHT: 68 IN | BODY MASS INDEX: 33.34 KG/M2 | RESPIRATION RATE: 19 BRPM | DIASTOLIC BLOOD PRESSURE: 88 MMHG | HEART RATE: 90 BPM | SYSTOLIC BLOOD PRESSURE: 130 MMHG | WEIGHT: 220 LBS

## 2025-05-21 DIAGNOSIS — Z00.00 ENCOUNTER FOR ANNUAL PHYSICAL EXAM: Primary | ICD-10-CM

## 2025-05-21 DIAGNOSIS — K21.9 GASTROESOPHAGEAL REFLUX DISEASE, UNSPECIFIED WHETHER ESOPHAGITIS PRESENT: ICD-10-CM

## 2025-05-21 DIAGNOSIS — R09.81 NASAL CONGESTION: ICD-10-CM

## 2025-05-21 DIAGNOSIS — Z23 NEED FOR SHINGLES VACCINE: ICD-10-CM

## 2025-05-21 DIAGNOSIS — Z12.11 SCREENING FOR COLON CANCER: ICD-10-CM

## 2025-05-21 DIAGNOSIS — G47.33 OBSTRUCTIVE SLEEP APNEA: ICD-10-CM

## 2025-05-21 PROCEDURE — 3075F SYST BP GE 130 - 139MM HG: CPT | Performed by: FAMILY MEDICINE

## 2025-05-21 PROCEDURE — 3079F DIAST BP 80-89 MM HG: CPT | Performed by: FAMILY MEDICINE

## 2025-05-21 PROCEDURE — 3008F BODY MASS INDEX DOCD: CPT | Performed by: FAMILY MEDICINE

## 2025-05-21 PROCEDURE — 90471 IMMUNIZATION ADMIN: CPT | Performed by: FAMILY MEDICINE

## 2025-05-21 PROCEDURE — 90750 HZV VACC RECOMBINANT IM: CPT | Performed by: FAMILY MEDICINE

## 2025-05-21 PROCEDURE — 99396 PREV VISIT EST AGE 40-64: CPT | Performed by: FAMILY MEDICINE

## 2025-05-21 RX ORDER — OMEPRAZOLE 40 MG/1
40 CAPSULE, DELAYED RELEASE ORAL DAILY
Qty: 90 CAPSULE | Refills: 3 | Status: SHIPPED | OUTPATIENT
Start: 2025-05-21

## 2025-05-21 NOTE — PROGRESS NOTES
The following individual(s) verbally consented to be recorded using ambient AI listening technology and understand that they can each withdraw their consent to this listening technology at any point by asking the clinician to turn off or pause the recording:    Patient name: Vik Frey

## 2025-05-21 NOTE — PATIENT INSTRUCTIONS
Health Screening Guidelines, Men Ages 50 to 64   Screening tests and health counseling are a key part of managing your health. A screening test is done to find disorders or diseases in people who don't have any symptoms. Screening tests are not used to diagnose. They are used to find out if more testing is needed. The goal may be to find a disease early so it can be treated with more success. Or the goal may be to find a disease early so you can make lifestyle changes. You may need regular checkups to help you reduce your risk of disease.   Below are guidelines for men ages 50 to 64. Talk with your healthcare provider. Make sure you’re up-to-date on what you need.   We understand gender is a spectrum. We may use gendered terms to talk about anatomy and health risk. Please use this information in a way that works best for you and your provider as you talk about your care.   Screening Who needs it How often   Unhealthy alcohol use  All men in this age group  At routine exams   Blood pressure All men in this age group  Once a year if your blood pressure is normal. Normal blood pressure is less than 120/80 mm Hg. If your blood pressure is higher than this, follow the advice of your healthcare provider.    Colorectal cancer All men in this age group  Talk with your healthcare provider about which test below is right for you:   Colonoscopy every 10 years  Flexible sigmoidoscopy every 5 years or every 10 years with yearly fecal immunochemical test (FIT) stool test  CT colonography (virtual colonoscopy) every 5 years  Yearly fecal occult blood test  Yearly FIT  Stool FIT-DNA test (also called the stool DNA test) every 3 years  If you have a test that is not a colonoscopy and have an abnormal test result, you will need a colonoscopy.   You may need to be screened more or less often. This is based on personal or family health history. Talk with your healthcare provider.    Depression All men in this age group  At routine  exams   Type 2 diabetes or prediabetes  All men in this age group with no symptoms who are overweight or obese.  At least every 3 years (yearly if your blood sugar has already begun to rise)    Type 2 diabetes All men with prediabetes  Every year   Screening Who needs it How often   Hepatitis C All adults age 18 or older at least once in a lifetime.  Talk with your healthcare provider about your risk factors and how often to have hepatitis C screening.    High cholesterol or triglycerides  All men in this age group  About every 1 to 2 years. Expert groups vary in their advice. Talk with your healthcare provider about your risk factors and how often you should be tested.    HIV All men in this age group  At least 1 time. Talk with your healthcare provider about your risk factors. Ask if you should be tested more often.    Lung cancer All men in this age group who are in fairly good health and are at higher risk for lung cancer, and who:   Smoke or quit in the past 15 years  Have a 20-pack per year smoking history (1 pack a day for 20 years or 2 packs a day for 10 years)  Expert groups vary in their advice. Talk with your healthcare provider. Yearly lung cancer screening with a low-dose CT scan. Talk with your healthcare provider about your risk factors.    Obesity All men in this age group  At yearly routine exams    BMI (body mass index) All men in this age group Every year, to help find out if you are at a healthy weight for your height.    Prostate cancer All men in this age group, talk with your healthcare provider about risks and benefits of a digital rectal exam and prostate-specific antigen screening  At routine exams if you decide to be tested.    Syphilis Men at higher risk for infection  At routine exams. Talk with your healthcare provider.    Tuberculosis Men at higher risk for infection  Talk with your healthcare provider    Vision All men in this age group  Baseline screening at age 40. Talk with your  healthcare provider about how often to have vision exams.    Health counseling  Who needs it  How often    Diet and exercise Men who are overweight or obese  When diagnosed, and then at routine exams    Sexually transmitted infection prevention  Men at higher risk for infection  At routine exams, talk with your healthcare provider    Use of tobacco and the health effects it can cause  All men in this age group  Every exam   [employer] last reviewed this educational content on 4/1/2024  This information is for informational purposes only. This is not intended to be a substitute for professional medical advice, diagnosis, or treatment. Always seek the advice and follow the directions from your physician or other qualified health care provider.  © 5105-3487 [employer]. All rights reserved. This information is not intended as a substitute for professional medical care. Always follow your healthcare professional's instructions.

## 2025-05-22 ENCOUNTER — LAB ENCOUNTER (OUTPATIENT)
Dept: LAB | Age: 55
End: 2025-05-22
Attending: FAMILY MEDICINE
Payer: COMMERCIAL

## 2025-05-22 DIAGNOSIS — Z00.00 ENCOUNTER FOR ANNUAL PHYSICAL EXAM: ICD-10-CM

## 2025-05-22 LAB
ALBUMIN SERPL-MCNC: 4.6 G/DL (ref 3.2–4.8)
ALBUMIN/GLOB SERPL: 1.7 {RATIO} (ref 1–2)
ALP LIVER SERPL-CCNC: 61 U/L (ref 45–117)
ALT SERPL-CCNC: 34 U/L (ref 10–49)
ANION GAP SERPL CALC-SCNC: 5 MMOL/L (ref 0–18)
AST SERPL-CCNC: 24 U/L (ref ?–34)
BASOPHILS # BLD AUTO: 0.05 X10(3) UL (ref 0–0.2)
BASOPHILS NFR BLD AUTO: 0.4 %
BILIRUB SERPL-MCNC: 1.3 MG/DL (ref 0.3–1.2)
BUN BLD-MCNC: 13 MG/DL (ref 9–23)
CALCIUM BLD-MCNC: 9.9 MG/DL (ref 8.7–10.6)
CHLORIDE SERPL-SCNC: 102 MMOL/L (ref 98–112)
CHOLEST SERPL-MCNC: 217 MG/DL (ref ?–200)
CO2 SERPL-SCNC: 30 MMOL/L (ref 21–32)
CREAT BLD-MCNC: 0.92 MG/DL (ref 0.7–1.3)
EGFRCR SERPLBLD CKD-EPI 2021: 98 ML/MIN/1.73M2 (ref 60–?)
EOSINOPHIL # BLD AUTO: 0.08 X10(3) UL (ref 0–0.7)
EOSINOPHIL NFR BLD AUTO: 0.7 %
ERYTHROCYTE [DISTWIDTH] IN BLOOD BY AUTOMATED COUNT: 13.3 %
FASTING PATIENT LIPID ANSWER: YES
FASTING STATUS PATIENT QL REPORTED: YES
GLOBULIN PLAS-MCNC: 2.7 G/DL (ref 2–3.5)
GLUCOSE BLD-MCNC: 100 MG/DL (ref 70–99)
HCT VFR BLD AUTO: 47.4 % (ref 39–53)
HDLC SERPL-MCNC: 57 MG/DL (ref 40–59)
HGB BLD-MCNC: 15.2 G/DL (ref 13–17.5)
IMM GRANULOCYTES # BLD AUTO: 0.05 X10(3) UL (ref 0–1)
IMM GRANULOCYTES NFR BLD: 0.4 %
LDLC SERPL CALC-MCNC: 132 MG/DL (ref ?–100)
LYMPHOCYTES # BLD AUTO: 1.81 X10(3) UL (ref 1–4)
LYMPHOCYTES NFR BLD AUTO: 16.2 %
MCH RBC QN AUTO: 27.1 PG (ref 26–34)
MCHC RBC AUTO-ENTMCNC: 32.1 G/DL (ref 31–37)
MCV RBC AUTO: 84.6 FL (ref 80–100)
MONOCYTES # BLD AUTO: 0.86 X10(3) UL (ref 0.1–1)
MONOCYTES NFR BLD AUTO: 7.7 %
NEUTROPHILS # BLD AUTO: 8.34 X10 (3) UL (ref 1.5–7.7)
NEUTROPHILS # BLD AUTO: 8.34 X10(3) UL (ref 1.5–7.7)
NEUTROPHILS NFR BLD AUTO: 74.6 %
NONHDLC SERPL-MCNC: 160 MG/DL (ref ?–130)
OSMOLALITY SERPL CALC.SUM OF ELEC: 284 MOSM/KG (ref 275–295)
PLATELET # BLD AUTO: 237 10(3)UL (ref 150–450)
POTASSIUM SERPL-SCNC: 4.5 MMOL/L (ref 3.5–5.1)
PROT SERPL-MCNC: 7.3 G/DL (ref 5.7–8.2)
RBC # BLD AUTO: 5.6 X10(6)UL (ref 4.3–5.7)
SODIUM SERPL-SCNC: 137 MMOL/L (ref 136–145)
TRIGL SERPL-MCNC: 158 MG/DL (ref 30–149)
TSI SER-ACNC: 0.97 UIU/ML (ref 0.55–4.78)
VLDLC SERPL CALC-MCNC: 29 MG/DL (ref 0–30)
WBC # BLD AUTO: 11.2 X10(3) UL (ref 4–11)

## 2025-05-22 PROCEDURE — 36415 COLL VENOUS BLD VENIPUNCTURE: CPT

## 2025-05-22 PROCEDURE — 85025 COMPLETE CBC W/AUTO DIFF WBC: CPT

## 2025-05-22 PROCEDURE — 80061 LIPID PANEL: CPT

## 2025-05-22 PROCEDURE — 80053 COMPREHEN METABOLIC PANEL: CPT

## 2025-05-22 PROCEDURE — 84443 ASSAY THYROID STIM HORMONE: CPT

## 2025-05-31 NOTE — PROGRESS NOTES
[facility] Family Medicine Office Note  Chief Complaint:   Chief Complaint   Patient presents with    Well Adult       HPI:   This is a 54 year old male coming in for annual physical.    Chronic nasal congestion - following with ENT. Remains symptomatic. Plan for continued surveillance. He has had multiple ENT surgeries.    Past Medical History[1]  Past Surgical History[2]  Social History:  Short Social Hx on File[3]  Family History:  Family History[4]  Allergies:  Allergies[5]  Current Meds:  Current Medications[6]   Counseling given: Not Answered       REVIEW OF SYSTEMS:   Review of Systems   Constitutional: Negative.    HENT: Negative.     Eyes: Negative.    Respiratory: Negative.     Cardiovascular: Negative.    Gastrointestinal: Negative.    Endocrine: Negative.    Genitourinary: Negative.    Musculoskeletal: Negative.    Skin: Negative.    Neurological: Negative.    Psychiatric/Behavioral: Negative.          EXAM:   /88   Pulse 90   Resp 19   Ht 5' 8\" (1.727 m)   Wt 220 lb (99.8 kg)   SpO2 99%   BMI 33.45 kg/m²  Estimated body mass index is 33.45 kg/m² as calculated from the following:    Height as of this encounter: 5' 8\" (1.727 m).    Weight as of this encounter: 220 lb (99.8 kg).   Vital signs reviewed.Appears stated age, well groomed.  Physical Exam  Vitals and nursing note reviewed.   Constitutional:       Appearance: Normal appearance. He is well-developed.   HENT:      Head: Normocephalic and atraumatic.      Right Ear: Tympanic membrane, ear canal and external ear normal.      Left Ear: Tympanic membrane, ear canal and external ear normal.      Nose: Nose normal. No congestion or rhinorrhea.      Mouth/Throat:      Mouth: Mucous membranes are moist.      Pharynx: Oropharynx is clear. No oropharyngeal exudate or posterior oropharyngeal erythema.   Eyes:      Extraocular Movements: Extraocular movements intact.      Conjunctiva/sclera: Conjunctivae normal.      Pupils: Pupils are  equal, round, and reactive to light.   Neck:      Thyroid: No thyromegaly.   Cardiovascular:      Rate and Rhythm: Normal rate and regular rhythm.      Pulses: Normal pulses.      Heart sounds: Normal heart sounds. No murmur heard.  Pulmonary:      Effort: Pulmonary effort is normal. No respiratory distress.      Breath sounds: Normal breath sounds. No stridor. No wheezing, rhonchi or rales.   Chest:      Chest wall: No tenderness.   Abdominal:      General: Bowel sounds are normal. There is no distension.      Palpations: Abdomen is soft. There is no mass.      Tenderness: There is no abdominal tenderness. There is no guarding or rebound.      Hernia: No hernia is present.   Musculoskeletal:         General: No tenderness. Normal range of motion.      Cervical back: Normal range of motion.   Lymphadenopathy:      Cervical: No cervical adenopathy.   Skin:     General: Skin is warm and dry.      Findings: No rash.   Neurological:      Mental Status: He is alert and oriented to person, place, and time.   Psychiatric:         Mood and Affect: Mood normal.         Behavior: Behavior normal.         Thought Content: Thought content normal.         Judgment: Judgment normal.          ASSESSMENT AND PLAN:   1. Encounter for annual physical exam  -Immunizations: Consider prevnar. Shingrex due today.   -Metabolic: BMI 33. BP wnl. Due for annual labs   -Cancer screening: due for CRC screening  -Communicable disease: low risk   -Mental health: no concerns   -Other preventative: follow with dentistry and optometry.   -Lifestyle: Follow a well balanced healthy diet with emphasis on fruits, vegetables, whole grains, lean meats. Limit processed and junk foods. Aim for at least 150 minutes of moderate intensity exercise weekly. Make sure you are staying adequately hydrated. Aim to get 7-9 hours of sleep nightly.     - CBC With Differential With Platelet; Future  - Comp Metabolic Panel (14); Future  - Lipid Panel; Future  - TSH W  Reflex To Free T4; Future    2. Screening for colon cancer  - SURGERY - INTERNAL    3. Gastroesophageal reflux disease, unspecified whether esophagitis present  Stable, CPM  - Omeprazole 40 MG Oral Capsule Delayed Release; Take 1 capsule (40 mg total) by mouth daily.  Dispense: 90 capsule; Refill: 3    4. Need for shingles vaccine  - ZOSTER VACC RECOMBINANT IM NJX    5. Nasal congestion  Following w/ ENT    6. Obstructive sleep apnea  Does not wish to pursue CPAP at this time. Reviewed risks of uncontrolled CARY - he expressed understanding. CTM.       Meds & Refills for this Visit:  Requested Prescriptions     Signed Prescriptions Disp Refills    Omeprazole 40 MG Oral Capsule Delayed Release 90 capsule 3     Sig: Take 1 capsule (40 mg total) by mouth daily.       Health Maintenance:  Health Maintenance Due   Topic Date Due    Colorectal Cancer Screening  Never done    Pneumococcal Vaccine: 50+ Years (1 of 2 - PCV) Never done    COVID-19 Vaccine (4 - 2024-25 season) 09/01/2024    Annual Depression Screening  01/01/2025    Annual Physical  07/17/2025       Patient/Caregiver Education: Patient/Caregiver Education: There are no barriers to learning. Medical education done.   Outcome: Patient verbalizes understanding. Patient is notified to call with any questions, complications, allergies, or worsening or changing symptoms.  Patient is to call with any side effects or complications from the treatments as a result of today.     Problem List:  Problem List[7]       [1]   Past Medical History:   Anxiety state    Asthma (HCC)    Depression    Esophageal reflux    Sleep apnea    CPAP   [2]   Past Surgical History:  Procedure Laterality Date    Other surgical history Bilateral     nasal septoplasty    Repair ing hernia,4+y/o,reducibl  2001    Sinus surgery        Open functional rhinoplasty, endoscopic septal perforation repair, bilateral inferior turbinate submucosal resection   [3]   Social History  Socioeconomic History     Marital status:    Tobacco Use    Smoking status: Former     Types: Cigarettes    Smokeless tobacco: Never   Vaping Use    Vaping status: Never Used   Substance and Sexual Activity    Alcohol use: Not Currently     Comment: ocassionally    Drug use: Never     Social Drivers of Health     Food Insecurity: Patient Declined (5/21/2025)    NCSS - Food Insecurity     Worried About Running Out of Food in the Last Year: Patient declined     Ran Out of Food in the Last Year: Patient declined   Transportation Needs: Patient Declined (5/21/2025)    NCSS - Transportation     Lack of Transportation: Patient declined   Housing Stability: Patient Declined (5/21/2025)    NCSS - Housing/Utilities     Has Housing: Patient declined     Worried About Losing Housing: Patient declined     Unable to Get Utilities: Patient declined   [4]   Family History  Problem Relation Age of Onset    Diabetes Father     Heart Surgery Father         Artery repair     Diabetes Mother    [5]   Allergies  Allergen Reactions    Penicillin G HIVES     Denies skin peeling/blister, organ damage/failure     [6]   Current Outpatient Medications   Medication Sig Dispense Refill    Omeprazole 40 MG Oral Capsule Delayed Release Take 1 capsule (40 mg total) by mouth daily. 90 capsule 3    azelastine 0.1 % Nasal Solution 2 sprays by Nasal route 2 (two) times daily. 30 mL 3    flunisolide 25 MCG/ACT (0.025%) Nasal Solution 2 sprays by Each Nare route 2 (two) times daily. 25 mL 0    Hypertonic Nasal Wash (SINUS RINSE BOTTLE KIT) Nasal Powd Pack Please use the Neilmed Sinus Rinse Squeeze Bottle. Fill the bottle to the dotted line with distilled or appropriately filtered water (DO NOT USE TAP WATER). Add in 1 Neilmed saline powder pouch. Lean over a sink and tilt your head down. Irrigate each nasal cavity with half of the bottle. Do this 2 times a day until you are told to stop. 1 each 0    Hypertonic Nasal Wash (SINUS RINSE KIT) Nasal Powd Pack Please use the  Neilmed Sinus Rinse Squeeze Bottle. Fill the bottle to the dotted line with distilled or appropriately filtered water (DO NOT USE TAP WATER). Add in 1 Neilmed saline powder pouch. Lean over a sink and tilt your head down. Irrigate each nasal cavity with half of the bottle. Do this 2 times a day until you are told to stop. 50 each 2    clobetasol 0.05 % External Cream Apply 1 Application topically 2 (two) times daily. 1 each 0    albuterol 108 (90 Base) MCG/ACT Inhalation Aero Soln Inhale 2 puffs into the lungs every 6 (six) hours as needed for Wheezing or Shortness of Breath. 1 each 11    clotrimazole-betamethasone 1-0.05 % External Cream Apply 1 Application topically 2 (two) times daily as needed. 60 g 3    Sildenafil Citrate 50 MG Oral Tab Take 1 tablet (50 mg total) by mouth daily as needed for Erectile Dysfunction. 30 tablet 0    acetaminophen 325 MG Oral Tab Take 1 tablet (325 mg total) by mouth every 6 (six) hours as needed for Pain.     [7]   Patient Active Problem List  Diagnosis    Pulmonary nodule    Mild intermittent asthma without complication (HCC)    Obstructive sleep apnea    Apnea    Asymmetry of tonsils    Nasal congestion    Snoring

## 2025-06-23 ENCOUNTER — HOSPITAL ENCOUNTER (OUTPATIENT)
Age: 55
Discharge: HOME | End: 2025-06-23
Attending: EMERGENCY MEDICINE
Payer: COMMERCIAL

## 2025-06-23 VITALS
DIASTOLIC BLOOD PRESSURE: 86 MMHG | BODY MASS INDEX: 30 KG/M2 | HEART RATE: 83 BPM | OXYGEN SATURATION: 97 % | SYSTOLIC BLOOD PRESSURE: 126 MMHG | WEIGHT: 200 LBS | RESPIRATION RATE: 18 BRPM | TEMPERATURE: 98 F

## 2025-06-23 DIAGNOSIS — H10.33 ACUTE CONJUNCTIVITIS OF BOTH EYES, UNSPECIFIED ACUTE CONJUNCTIVITIS TYPE: ICD-10-CM

## 2025-06-23 DIAGNOSIS — L23.7 POISON OAK DERMATITIS: Primary | ICD-10-CM

## 2025-06-23 PROCEDURE — 99213 OFFICE O/P EST LOW 20 MIN: CPT

## 2025-06-23 RX ORDER — PREDNISONE 20 MG/1
TABLET ORAL
Qty: 30 TABLET | Refills: 0 | Status: SHIPPED | OUTPATIENT
Start: 2025-06-23

## 2025-06-23 RX ORDER — POLYMYXIN B SULFATE AND TRIMETHOPRIM 1; 10000 MG/ML; [USP'U]/ML
1 SOLUTION OPHTHALMIC
Qty: 10 ML | Refills: 0 | Status: SHIPPED | OUTPATIENT
Start: 2025-06-23 | End: 2025-06-28

## 2025-06-23 NOTE — ED PROVIDER NOTES
Patient Seen in: [facility]        History  No chief complaint on file.    Stated Complaint: Rash    Subjective:   HPI    Patient is a 54-year-old male who states for the past 3 to 4 days he has noticed a rash to his left leg as well as starting on his right leg.  Patient states it is pruritic.  Patient states he was out in the woods.  Patient states he had something similar in the past and it was poison oak.  Patient states he spread it throughout his body at that time.  Patient did wash the area cleaned the area still has the rash.  Patient states it is pruritic no fevers or chills.  Remainder of review of systems negative.  Patient also states last couple days he is at some slight discharge from both eyes.  No blurry vision double vision.  No URI symptoms.      Objective:     No pertinent past medical history.            No pertinent past surgical history.              No pertinent social history.            Review of Systems    Positive for stated complaint: Rash  Other systems are as noted in HPI.  Constitutional and vital signs reviewed.      All other systems reviewed and negative except as noted above.                  Physical Exam    ED Triage Vitals [06/23/25 1208]   /86   Pulse 83   Resp 18   Temp 98.4 °F (36.9 °C)   Temp src Oral   SpO2 97 %   O2 Device None (Room air)       Current Vitals:   Vital Signs  BP: 126/86  Pulse: 83  Resp: 18  Temp: 98.4 °F (36.9 °C)  Temp src: Oral    Oxygen Therapy  SpO2: 97 %  O2 Device: None (Room air)            Physical Exam   GENERAL: Patient resting comfortably on the cart in no acute distress.  HEENT: Extraocular muscles intact, pupils equal round reactive to light and accommodation.  Mouth normal, neck supple, no meningismus.  Slight scleral injection bilaterally.  LUNGS: Lungs clear to auscultation bilaterally.  CARDIOVASCULAR: + S1-S2, regular rate and rhythm, no murmurs.    EXTREMITIES: Full range of motion, no tenderness, good capillary  refill.  SKIN: Patient has erythematous maculopapular rash to lateral anterior left leg as well as lateral right leg.  Areas of linear rash as well.  No purpura or vesicles, good turgor.  NEURO: Patient answers questions appropriately.  No focal deficits appreciated.            ED Course  Labs Reviewed - No data to display                         MDM     Patient will be given prednisone for the rash.   also antibiotic drops for the conjunctivitis.  Recommend Benadryl or Zyrtec as discussed.  Follow-up for for evaluation primary physician.  Return if new or worse symptoms.  I did consider contact dermatitis, poison oak, poison ivy, conjunctivitis        Medical Decision Making      Disposition and Plan     Clinical Impression:  1. Poison oak dermatitis    2. Acute conjunctivitis of both eyes, unspecified acute conjunctivitis type         Disposition:  Discharge  6/23/2025 12:27 pm    Follow-up:  Ori Burch MD  [id number]  [facility] [address]  [phone]    In 1 week            Medications Prescribed:  Current Discharge Medication List        START taking these medications    Details   polymyxin B-trimethoprim 82313-4.1 UNIT/ML-% Ophthalmic Solution Apply 1 drop to eye Q3H While Awake for 5 days.  Qty: 10 mL, Refills: 0      predniSONE 20 MG Oral Tab 60 mg a day for 5 days, then 40 mg a day for 5 days, then 20 mg a day for 5 days  Qty: 30 tablet, Refills: 0                   Supplementary Documentation:

## 2025-06-23 NOTE — DISCHARGE INSTRUCTIONS
Follow-up for further evaluation primary physician.  Return or go to the ER if new or worse symptoms.  Medications as prescribed.  Benadryl or Zyrtec as needed.

## (undated) DEVICE — SUT PERMA- 2-0 18IN FS NABSRB BLK 26MM 3/8

## (undated) DEVICE — SPLINT INTNSL W0.6XL2IN CHITOSAN POLYMR

## (undated) DEVICE — SLEEVE COMPR M KNEE LEN SGL USE KENDALL SCD

## (undated) DEVICE — SOLUTION IRRIG 1000ML 0.9% NACL USP BTL

## (undated) DEVICE — ZZ-CONVERTED-TO-500976-PENCIL SMK EVAC L10FT MPLR BLDE JAW OPN

## (undated) DEVICE — SPLINT 1529010 10PK THERMASPLINT MEDIUM

## (undated) DEVICE — ELECTROSURGICAL SUCTION COAGULATOR, 10FR: Brand: CONMED

## (undated) DEVICE — SUCTION CANISTER, 3000CC,SAFELINER: Brand: DEROYAL

## (undated) DEVICE — SUT PLN GUT 5-0 18IN PC-1 ABSRB TAN YELLOWISH

## (undated) DEVICE — SINUS CDS: Brand: MEDLINE INDUSTRIES, INC.

## (undated) DEVICE — BLADE 1884004 TRICUT 5PK 4MM: Brand: TRICUT®

## (undated) DEVICE — INTENDED FOR TISSUE SEPARATION, AND OTHER PROCEDURES THAT REQUIRE A SHARP SURGICAL BLADE TO PUNCTURE OR CUT.: Brand: BARD-PARKER ® STAINLESS STEEL BLADES

## (undated) DEVICE — UNDYED MONOFILAMENT (POLYDIOXANONE), ABSORBABLE SYNTHETIC SURGICAL SUTURE: Brand: PDS

## (undated) DEVICE — SOLUTION IRRIG 1000ML ST H2O AQUALITE PLAS

## (undated) DEVICE — SUT PDS II 4-0 27IN RB-1 ABSRB VLT L17MM 1/2

## (undated) DEVICE — E-Z CLEAN, NON-STICK, PTFE COATED, ELECTROSURGICAL NEEDLE ELECTRODE, MODIFIED EXTENDED INSULATION, 2.75 INCH (7 CM): Brand: MEGADYNE

## (undated) DEVICE — Device

## (undated) DEVICE — MEDI-VAC NON-CONDUCTIVE SUCTION TUBING: Brand: CARDINAL HEALTH

## (undated) DEVICE — GAMMEX® PI HYBRID SIZE 7.5, STERILE POWDER-FREE SURGICAL GLOVE, POLYISOPRENE AND NEOPRENE BLEND: Brand: GAMMEX

## (undated) DEVICE — STERILE POLYISOPRENE POWDER-FREE SURGICAL GLOVES: Brand: PROTEXIS

## (undated) DEVICE — APPLICATOR STD 6IN COT TIP WOOD HNDL ST

## (undated) DEVICE — SUT PLN GUT 4-0 18IN SC-1 ABSRB TAN YELLOWISH

## (undated) DEVICE — ENTACT SEPTAL STAPLER 3 PACK: Brand: ENT SINUS

## (undated) DEVICE — SUT VCRL 5-0 18IN P-3 ABSRB UD L13MM 3/8 CIR

## (undated) DEVICE — SPLINT 1524055 DOYLE II AIRWAY SET: Brand: DOYLE II ™

## (undated) DEVICE — PACK CDS HEAD

## (undated) DEVICE — PAD,EYE,LARGE,2 1/8"X2 5/8",STERILE,LF: Brand: MEDLINE

## (undated) DEVICE — PACK CUSTOM NASAL ACCESSORY

## (undated) NOTE — LETTER
ASTHMA ACTION PLAN for Hiren Castrejon     : 1970     Date: 2022  Provider:  Arabella Cornell MD  Phone for doctor or clinic: [facility], [facility], [address] [facility] [address]  [address]  [phone]           You can use the colors of a traffic light to help learn about your asthma medicines. 1. Green - Go! % of Personal Best Peak Flow Use controller medicine. Breathing is good  No cough or wheeze  Can work and play Medicine How much to take When to take it    No regular control medication      2. Yellow - Caution. 50-79% Personal Best Peak  Flow. Use reliever medicine to keep an asthma attack from getting bad. Cough  Wheezing  Tight Chest  Wake up at night Medicine How much to take When to take it    albuterol 108 (90 Base) MCG/ACT Inhalation Aero Soln  Inhale 2 puffs into the lungs every 6 (six) hours as needed for Wheezing or Shortness of Breath. Additional instructions Call our office in event of an increased use of rescue inhaler to make an appointment in order to prevent red zone symptoms. 3. Red - Stop! Danger!  <50% Personal Best Peak  Flow. Take these medications until  Get help from a doctor   Medicine not helping  Breathing is hard and fast  Nose opens wide  Can't walk  Ribs show  Can't talk well Medicine How much to take When to take it    Call 911, or go to the emergency room immediately! Take Albuterol every 15 minutes until you have received medical attention. Additional Instructions If your symptoms do not improve and you cannot contact your doctor, [facility] or call 911 immediately! [x] Asthma Action Plan reviewed with patient (and caregiver if necessary) and a copy of the plan was given to the patient/caregiver. [] Asthma Action Plan reviewed with patient (and caregiver if necessary) on the phone and mailed copy to patient or submitted via [address]      Signatures:  Provider  Arabella Cornell MD   Patient Caretaker

## (undated) NOTE — LETTER
[facility]  Phone: [phone]  Right Fax: [phone]    ADDRESSEE INFORMATION: SENDER INFORMATION:   To:   Dr. Benedict Barnett From: [facility]     Department: Pre-Admission Testing   Fax Number: [phone] Date:   2023     Phone Number: [phone] Phone Number: [phone]   Re: Patient Name: Shakeel Holt  CSN: [id number]  Medical Record: [id number]   : 1970 - A: 47 y    Sex: male Fax Number: [phone]     Number of Pages (Including Cover Sheet) 1       Beta-lactam Antibiotic Allergy/Sensitivity/Contraindication    The above patient has a Beta-lactam allergy/sensitivity or contraindication to the antibiotic ordered from your standing orders/[facility] Pre-op Standing orders/[facility] Adult Preoperative Prophylactic Protocol listed below. If you would like the antibiotic given, please fax this form back indicating the override reason with a physician signature/date/and time. ____PENICILLIN_____________          ___________HIVES_______________________   Name of Medication                                                    Allergic Reaction    o Proceed with recommended Beta-lactam antibiotic as benefit outweighs risk  o Proceed with recommended Beta-lactam antibiotic as not a true allergy  o Utilize recommended antibiotic for Anaphylaxis or Life-threatening Beta-lactam allergy      If you prefer to order an alternate antibiotic please list drug, dose, route and time to administer below:    _______________________________________________________________________________                   (Antibiotic, dose, route, and time of administration)    ________________________________________Date____________Time________  (MD signature)    Fax this form back to [phone]    This message is intended only for the use of the individual or entity to which it is addressed.  It may have been disclosed to you from records whose confidentiality is protected by [employer] and applicable state law. Federal Regulation, 45 C. [address]ha [address]vin., part 164, prohibits you from making any further disclosure without specific authorization of the person to whom it pertains, or as otherwise permitted by such regulations. If the reader of this message is not the intended recipient, you are hereby notified that reading, disseminating, distributing or copying this communication is strictly prohibited. If you have received this communication in error, please immediately notify us by telephone and return the original message to us at [facility]. 15, [address] via the [employer].

## (undated) NOTE — LETTER
Patient Name: Keagan Chavez 12/21/1970-A: 47 y Sex: male   MRN: [id number] CSN: [id number]      ANTIBIOTIC ALLERGY/SENSITIVITY/CONTRAINDICATION  Surgery Date:  12/18/2023  Procedure: Nasal Septoplasty; Bilateral Out Fracture of Inferior Turbinates, Bilateral Submucous Resection of Inferior Turbinates  Anesthesia Type: General  Surgeon(s):  Milton Garrett MD    Allergy Reaction: HIVES    The above patient has an allergy/sensitivity or contraindication to the antibiotic ordered from your standing orders/[facility] Pre-op Standing orders/[facility] Adult Preoperative Prophylactic Protocol listed below. If you would like the medication given, please fax this form back indicating the override reason with a physician signature/date/and time.        PENICILLIN    ___  Benefit outweighs risk ___  Insignificant ___  Low risk      ___ Not a true allergy  ___  Dose appropriate ___  Tolerated regimen in the past   If you prefer to order an alternate antibiotic please list drug, dose, route and time to administer below:     _____________________________________  _______    ___________   __________________  Antibiotic                                                                                        Dose                 Route                        Time of administration    ________________________________________Date____________Time________  (MD signature)  Fax this form back to [phone]

## (undated) NOTE — LETTER
ASTHMA ACTION PLAN for Vik Frey     : 1970     Date: 2024  Provider:  Ori Burch MD  Phone for doctor or clinic: [facility], [address], [place]  [address]  [facility] [address] [phone]    ACT Score: 19      You can use the colors of a traffic light to help learn about your asthma medicines.      1. Green - Go! % of Personal Best Peak Flow Use controller medicine.   Breathing is good  No cough or wheeze  Can work and play Medicine How much to take When to take it    Fliticasone                2 sprays                                  daily      2. Yellow - Caution. 50-79% Personal Best Peak  Flow.  Use reliever medicine to keep an asthma attack from getting bad.   Cough  Wheezing  Tight Chest  Wake up at night Medicine How much to take When to take it    Albuterol Inhaler                   2 puffs                             Every 4 to 6 hrs as needed for                                                                                  shortness of breath and wheezing.          Additional instructions Call our office in event of an increased use of rescue inhaler to make an appointment in order to prevent red zone symptoms.           3. Red - Stop! Danger!  <50% Personal Best Peak  Flow. Take these medications until  Get help from a doctor   Medicine not helping  Breathing is hard and fast  Nose opens wide  Can't walk  Ribs show  Can't talk well Medicine How much to take When to take it    Call 911, or go to the emergency room immediately! Take Albuterol every 15 minutes until you have received medical attention.         Additional Instructions If your symptoms do not improve and you cannot contact your doctor, [facility] or call 911 immediately!     [x] Asthma Action Plan reviewed with patient (and caregiver if necessary) and a copy of the plan was given to the patient/caregiver.   [] Asthma Action Plan reviewed with patient  (and caregiver if necessary) on the phone and mailed copy to patient or submitted via [employer].     Signatures:  Provider  Ori Burch MD   Patient Caretaker